# Patient Record
Sex: MALE | Race: WHITE | NOT HISPANIC OR LATINO | Employment: OTHER | URBAN - NONMETROPOLITAN AREA
[De-identification: names, ages, dates, MRNs, and addresses within clinical notes are randomized per-mention and may not be internally consistent; named-entity substitution may affect disease eponyms.]

---

## 2017-09-25 ENCOUNTER — TELEPHONE (OUTPATIENT)
Dept: FAMILY MEDICINE | Facility: OTHER | Age: 67
End: 2017-09-25

## 2017-09-25 NOTE — TELEPHONE ENCOUNTER
Per Dr. Espinoza, patient may be seen 9/27/17 to register at 8:15. Writer is unable to reach patient at number provided after 3 attempts.

## 2017-09-25 NOTE — TELEPHONE ENCOUNTER
8:17 AM    Reason for Call: OVERBOOK    Patient is having the following symptoms: back pain for two weeks    The patient is requesting an appointment for today with PCP Dr. Carmleina Espinoza.    Was an appointment offered for this call? No  If yes : Appointment type Short              Date None Available today    Preferred method for responding to this message: Telephone Call  What is your phone number ?[phone]    If we cannot reach you directly, may we leave a detailed response at the number you provided? No    Can this message wait until your PCP/provider returns, if unavailable today? PCP is in    [place]

## 2017-09-25 NOTE — TELEPHONE ENCOUNTER
9:38 AM    Reason for Call: OVERBOOK    Patient is having the following symptoms: hurt mid back lifting heavy object for 2 weeks.    The patient is requesting an appointment for 09/25/2017 with Dr Espinoza.    Was an appointment offered for this call? Yes  If yes : Appointment type              Date    Preferred method for responding to this message: Telephone Call/[phone] Wife Sendy's cell  What is your phone number ?    If we cannot reach you directly, may we leave a detailed response at the number you provided? yes    Can this message wait until your PCP/provider returns, if unavailable today? No,     Milagro Doshi

## 2017-09-27 ENCOUNTER — OFFICE VISIT (OUTPATIENT)
Dept: FAMILY MEDICINE | Facility: OTHER | Age: 67
End: 2017-09-27
Attending: FAMILY MEDICINE
Payer: COMMERCIAL

## 2017-09-27 VITALS
TEMPERATURE: 96.3 F | SYSTOLIC BLOOD PRESSURE: 132 MMHG | HEART RATE: 91 BPM | DIASTOLIC BLOOD PRESSURE: 88 MMHG | WEIGHT: 231 LBS | BODY MASS INDEX: 34.21 KG/M2 | HEIGHT: 69 IN | OXYGEN SATURATION: 96 %

## 2017-09-27 DIAGNOSIS — Z23 NEED FOR PROPHYLACTIC VACCINATION AND INOCULATION AGAINST INFLUENZA: ICD-10-CM

## 2017-09-27 DIAGNOSIS — M62.830 SPASM OF THORACIC BACK MUSCLE: Primary | ICD-10-CM

## 2017-09-27 PROCEDURE — 99213 OFFICE O/P EST LOW 20 MIN: CPT | Mod: 25 | Performed by: FAMILY MEDICINE

## 2017-09-27 PROCEDURE — 90471 IMMUNIZATION ADMIN: CPT | Performed by: FAMILY MEDICINE

## 2017-09-27 PROCEDURE — 90686 IIV4 VACC NO PRSV 0.5 ML IM: CPT | Performed by: FAMILY MEDICINE

## 2017-09-27 RX ORDER — CYCLOBENZAPRINE HCL 10 MG
10 TABLET ORAL 3 TIMES DAILY PRN
Qty: 30 TABLET | Refills: 1 | Status: SHIPPED | OUTPATIENT
Start: 2017-09-27 | End: 2018-05-02

## 2017-09-27 ASSESSMENT — PATIENT HEALTH QUESTIONNAIRE - PHQ9: SUM OF ALL RESPONSES TO PHQ QUESTIONS 1-9: 1

## 2017-09-27 ASSESSMENT — ANXIETY QUESTIONNAIRES
IF YOU CHECKED OFF ANY PROBLEMS ON THIS QUESTIONNAIRE, HOW DIFFICULT HAVE THESE PROBLEMS MADE IT FOR YOU TO DO YOUR WORK, TAKE CARE OF THINGS AT HOME, OR GET ALONG WITH OTHER PEOPLE: NOT DIFFICULT AT ALL
5. BEING SO RESTLESS THAT IT IS HARD TO SIT STILL: NOT AT ALL
3. WORRYING TOO MUCH ABOUT DIFFERENT THINGS: SEVERAL DAYS
4. TROUBLE RELAXING: NOT AT ALL
1. FEELING NERVOUS, ANXIOUS, OR ON EDGE: NOT AT ALL
2. NOT BEING ABLE TO STOP OR CONTROL WORRYING: NOT AT ALL
6. BECOMING EASILY ANNOYED OR IRRITABLE: NOT AT ALL
GAD7 TOTAL SCORE: 1
7. FEELING AFRAID AS IF SOMETHING AWFUL MIGHT HAPPEN: NOT AT ALL

## 2017-09-27 ASSESSMENT — PAIN SCALES - GENERAL: PAINLEVEL: MILD PAIN (2)

## 2017-09-27 NOTE — NURSING NOTE
"Chief Complaint   Patient presents with     Musculoskeletal Problem     mid back spasms, Duration- 3 weeks. specific cause- over work. pain therapies tried- heating pad, massage, pain pills( flexeril 10 mg)        Initial /88 (BP Location: Left arm, Patient Position: Chair, Cuff Size: Adult Large)  Pulse 91  Temp 96.3  F (35.7  C) (Tympanic)  Ht 5' 9\" (1.753 m)  Wt 231 lb (104.8 kg)  SpO2 96%  BMI 34.11 kg/m2 Estimated body mass index is 34.11 kg/(m^2) as calculated from the following:    Height as of this encounter: 5' 9\" (1.753 m).    Weight as of this encounter: 231 lb (104.8 kg).  Medication Reconciliation: complete     TYRELL IVERSON      "

## 2017-09-27 NOTE — PATIENT INSTRUCTIONS
Continue Tylenol since NSAIDs not tolerated.  Flexeril for muscle spasm as needed, limiting use - caution, may cause sedation.  Call if wanting referral to physical therapy.  Start home exercises as discussed.  Try tennis balls in sock as well.  Back Spasm (No Trauma)    Spasm of the back muscles can occur after a sudden forceful twisting or bending force (such as in a car accident), after a simple awkward movement, or after lifting something heavy with poor body positioning. In any case, muscle spasm adds to the pain. Sleeping in an awkward position or on a poor quality mattress can also cause this. Some people respond to emotional stress by tensing the muscles of their back.  Pain that continues may need further evaluation or other types of treatment such as physical therapy.  You don't always need X-rays for the initial evaluation of back pain, unless you had a physical injury such as from a car accident or fall. If your pain continues and doesn't respond to medical treatment, X-rays and other tests may then be done.   Home care    As soon as possible, start sitting or walking again to avoid problems from prolonged bed rest (muscle weakness, worsening back stiffness and pain, blood clots in the legs).    When in bed, try to find a position of comfort. A firm mattress is best. Try lying flat on your back with pillows under your knees. You can also try lying on your side with your knees bent up toward your chest and a pillow between your knees.    Avoid prolonged sitting, long car rides, or travel. This puts more stress on the lower back than standing or walking.     During the first 24 to 72 hours after an injury or flare-up, apply an ice pack to the painful area for 20 minutes, then remove it for 20 minutes. Do this over a period of 60 to 90 minutes or several times a day. This will reduce swelling and pain. Always wrap ice packs in a thin towel.    You can start with ice, then switch to heat. Heat (hot shower,  hot bath, or heating pad) reduces pain, and works well for muscle spasms. Apply heat to the painful area for 20 minutes, then remove it for 20 minutes. Do this over a period of 60 to 90 minutes or several times a day. Do not sleep on a heating pad as it can burn or damage skin.    Alternate ice and heat therapies.    Be aware of safe lifting methods and do not lift anything over 15 pounds until all the pain is gone.  Gentle stretching will help your back heal faster. Do this simple routine 2 to 3 times a day until your back is feeling better.    Lie on your back with your knees bent and both feet on the ground    Slowly raise your left knee to your chest as you flatten your lower back against the floor. Hold for 20 to 30 seconds.    Relax and repeat the exercise with your right knee.    Do 2 to 3 of these exercises for each leg.    Repeat, hugging both knees to your chest at the same time.    Do not bounce, but use a gentle pull.  Medicines  Talk to your doctor before using medicine, especially if you have other medical problems or are taking other medicines.  You may use acetaminophen or ibuprofen to control pain, unless your healthcare provider prescribed another pain medicine. If you have a chronic condition such as diabetes, liver or kidney disease, stomach ulcer, or gastrointestinal bleeding, or are taking blood thinners, talk with your healthcare provider before taking any medicines.  Be careful if you are given prescription pain medicine, narcotics, or medicine for muscle spasm. They can cause drowsiness, affect your coordination, reflexes, or judgment. Do not drive or operate heavy machinery when taking these medicines. Take pain medicine only as prescribed by your healthcare provider.  Follow-up care  Follow up with your doctor, or as advised. Physical therapy or further tests may be needed.  If X-rays were taken, they may be reviewed by a radiologist. You will be notified of any new findings that may  affect your care.  Call 911  Seek emergency medical care if any of these occur:    Trouble breathing    Confusion    Drowsiness or trouble awakening    Fainting or loss of consciousness    Rapid or very slow heart rate    Loss of bowel or bladder control  When to seek medical advice  Call your healthcare provider right away if any of these occur:    Pain becomes worse or spreads to your legs    Weakness or numbness in one or both legs    Numbness in the groin or genital area    Unexplained fever over 100.4 F (38.0 C)    Burning or pain when passing urine  Date Last Reviewed: 6/1/2016 2000-2017 [employer]. [address] [place] [address]. All rights reserved. This information is not intended as a substitute for professional medical care. Always follow your healthcare professional's instructions.

## 2017-09-27 NOTE — MR AVS SNAPSHOT
After Visit Summary   9/27/2017    Kevyn Henriquez    MRN: [id number]           Patient Information     Date Of Birth          1950        Visit Information        Provider Department      9/27/2017 8:30 AM Carmelina Espinoza MD [facility]        Today's Diagnoses     Spasm of thoracic back muscle    -  1      Care Instructions      Continue Tylenol since NSAIDs not tolerated.  Flexeril for muscle spasm as needed, limiting use - caution, may cause sedation.  Call if wanting referral to physical therapy.  Start home exercises as discussed.  Try tennis balls in sock as well.  Back Spasm (No Trauma)    Spasm of the back muscles can occur after a sudden forceful twisting or bending force (such as in a car accident), after a simple awkward movement, or after lifting something heavy with poor body positioning. In any case, muscle spasm adds to the pain. Sleeping in an awkward position or on a poor quality mattress can also cause this. Some people respond to emotional stress by tensing the muscles of their back.  Pain that continues may need further evaluation or other types of treatment such as physical therapy.  You don't always need X-rays for the initial evaluation of back pain, unless you had a physical injury such as from a car accident or fall. If your pain continues and doesn't respond to medical treatment, X-rays and other tests may then be done.   Home care    As soon as possible, start sitting or walking again to avoid problems from prolonged bed rest (muscle weakness, worsening back stiffness and pain, blood clots in the legs).    When in bed, try to find a position of comfort. A firm mattress is best. Try lying flat on your back with pillows under your knees. You can also try lying on your side with your knees bent up toward your chest and a pillow between your knees.    Avoid prolonged sitting, long car rides, or travel. This puts more stress on the lower back than standing  or walking.     During the first 24 to 72 hours after an injury or flare-up, apply an ice pack to the painful area for 20 minutes, then remove it for 20 minutes. Do this over a period of 60 to 90 minutes or several times a day. This will reduce swelling and pain. Always wrap ice packs in a thin towel.    You can start with ice, then switch to heat. Heat (hot shower, hot bath, or heating pad) reduces pain, and works well for muscle spasms. Apply heat to the painful area for 20 minutes, then remove it for 20 minutes. Do this over a period of 60 to 90 minutes or several times a day. Do not sleep on a heating pad as it can burn or damage skin.    Alternate ice and heat therapies.    Be aware of safe lifting methods and do not lift anything over 15 pounds until all the pain is gone.  Gentle stretching will help your back heal faster. Do this simple routine 2 to 3 times a day until your back is feeling better.    Lie on your back with your knees bent and both feet on the ground    Slowly raise your left knee to your chest as you flatten your lower back against the floor. Hold for 20 to 30 seconds.    Relax and repeat the exercise with your right knee.    Do 2 to 3 of these exercises for each leg.    Repeat, hugging both knees to your chest at the same time.    Do not bounce, but use a gentle pull.  Medicines  Talk to your doctor before using medicine, especially if you have other medical problems or are taking other medicines.  You may use acetaminophen or ibuprofen to control pain, unless your healthcare provider prescribed another pain medicine. If you have a chronic condition such as diabetes, liver or kidney disease, stomach ulcer, or gastrointestinal bleeding, or are taking blood thinners, talk with your healthcare provider before taking any medicines.  Be careful if you are given prescription pain medicine, narcotics, or medicine for muscle spasm. They can cause drowsiness, affect your coordination, reflexes, or  judgment. Do not drive or operate heavy machinery when taking these medicines. Take pain medicine only as prescribed by your healthcare provider.  Follow-up care  Follow up with your doctor, or as advised. Physical therapy or further tests may be needed.  If X-rays were taken, they may be reviewed by a radiologist. You will be notified of any new findings that may affect your care.  Call 911  Seek emergency medical care if any of these occur:    Trouble breathing    Confusion    Drowsiness or trouble awakening    Fainting or loss of consciousness    Rapid or very slow heart rate    Loss of bowel or bladder control  When to seek medical advice  Call your healthcare provider right away if any of these occur:    Pain becomes worse or spreads to your legs    Weakness or numbness in one or both legs    Numbness in the groin or genital area    Unexplained fever over 100.4 F (38.0 C)    Burning or pain when passing urine  Date Last Reviewed: 6/1/2016 2000-2017 [employer]. [address] [place] [address]. All rights reserved. This information is not intended as a substitute for professional medical care. Always follow your healthcare professional's instructions.                Follow-ups after your visit        Who to contact     If you have questions or need follow up information about today's clinic visit or your schedule please contact [facility] directly at [phone].  Normal or non-critical lab and imaging results will be communicated to you by MyChart, letter or phone within 4 business days after the clinic has received the results. If you do not hear from us within 7 days, please contact the clinic through MyChart or phone. If you have a critical or abnormal lab result, we will notify you by phone as soon as possible.  Submit refill requests through [employer] or call your pharmacy and they will forward the refill request to us. Please allow 3 business days for your refill to  "be completed.          Additional Information About Your Visit        [employer] Information     [employer] gives you secure access to your electronic health record. If you see a primary care provider, you can also send messages to your care team and make appointments. If you have questions, please call your primary care clinic.  If you do not have a primary care provider, please call [phone] and they will assist you.        Care EveryWhere ID     This is your Care EveryWhere ID. This could be used by other organizations to access your [facility] medical records  [id number]        Your Vitals Were     Pulse Temperature Height Pulse Oximetry BMI (Body Mass Index)       91 96.3  F (35.7  C) (Tympanic) 5' 9\" (1.753 m) 96% 34.11 kg/m2        Blood Pressure from Last 3 Encounters:   09/27/17 132/88   08/14/15 134/80   07/22/15 130/70    Weight from Last 3 Encounters:   09/27/17 231 lb (104.8 kg)   08/14/15 195 lb (88.5 kg)   07/22/15 190 lb (86.2 kg)              Today, you had the following     No orders found for display         Today's Medication Changes          These changes are accurate as of: 9/27/17  9:01 AM.  If you have any questions, ask your nurse or doctor.               Start taking these medicines.        Dose/Directions    cyclobenzaprine 10 MG tablet   Commonly known as:  FLEXERIL   Used for:  Spasm of thoracic back muscle   Started by:  Carmelina Espinoza MD        Dose:  10 mg   Take 1 tablet (10 mg) by mouth 3 times daily as needed for muscle spasms   Quantity:  30 tablet   Refills:  1         Stop taking these medicines if you haven't already. Please contact your care team if you have questions.     ALEVE PO   Stopped by:  Carmelina Espinoza MD                Where to get your medicines      These medications were sent to [id number] - [place] - [address]  [address] [address]     Phone:  [phone]     cyclobenzaprine 10 MG tablet                Primary Care " Provider Office Phone # Fax #    Carmelina Espinoza [phone]       [facility] [address]  [place] [address]        Equal Access to Services     MARGARETKASSY DK : Hadii aad ku haddeshawno Soomaali, waaxda luqadaha, qaybta kaalmada adeegyada, waxnichole perezn aixa cartwright larenkody lety. So [facility] [phone].    ATENCIÓN: Si habla español, tiene a livingston disposición servicios gratuitos de asistencia lingüística. Llame al [phone].    We comply with applicable federal civil rights laws and [place] laws. We do not discriminate on the basis of race, color, national origin, age, disability sex, sexual orientation or gender identity.            Thank you!     Thank you for choosing [facility]  for your care. Our goal is always to provide you with excellent care. Hearing back from our patients is one way we can continue to improve our services. Please take a few minutes to complete the written survey that you may receive in the mail after your visit with us. Thank you!             Your Updated Medication List - Protect others around you: Learn how to safely use, store and throw away your medicines at www.disposemymeds.org.          This list is accurate as of: 9/27/17  9:01 AM.  Always use your most recent med list.                   Brand Name Dispense Instructions for use Diagnosis    aspirin 81 MG tablet      Take 81 mg by mouth daily        cyclobenzaprine 10 MG tablet    FLEXERIL    30 tablet    Take 1 tablet (10 mg) by mouth 3 times daily as needed for muscle spasms    Spasm of thoracic back muscle

## 2017-09-27 NOTE — PROGRESS NOTES
SUBJECTIVE:  Kevyn is a 66 year old male who comes in today for back spasms, onset 3 weeks ago, gradual, progressive.  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  Pain is in mid back, right side.  No extremity numbness, weakness, or tingling.  No change in bowel/bladder.  No fever.  Tylenol takes edge off.  Can't tolerate NSAIDS - cause swelling in hands.  Tried a couple of friend's Flexeril and had some relief.  Work project will last 4-6 weeks.  Massage helps some as well.      Would like flu shot.    Current Outpatient Prescriptions   Medication     aspirin 81 MG tablet     cyclobenzaprine (FLEXERIL) 10 MG tablet     No current facility-administered medications for this visit.       No Known Allergies    Past Medical History:   Diagnosis Date     Tobacco abuse      Past Surgical History:   Procedure Laterality Date     EXCISE GANGLION WRIST Left 4/30/2015    Procedure: EXCISE GANGLION WRIST;  Surgeon: Phyllis Tuttle MD;  Location: [facility]     EXCISE MASS NECK N/A 4/30/2015    Procedure: EXCISE MASS NECK;  Surgeon: Phyllis Tuttle MD;  Location: [facility] OR     HC EXCIS PRIMARY GANGLION WRIST      left wrist     Social History     Social History     Marital status:      Spouse name: N/A     Number of children: N/A     Years of education: N/A     Occupational History     Not on file.     Social History Main Topics     Smoking status: Current Every Day Smoker     Packs/day: 0.50     Types: Cigarettes     Smokeless tobacco: Never Used     Alcohol use No     Drug use: No     Sexual activity: Yes     Partners: Female     Other Topics Concern     Parent/Sibling W/ Cabg, Mi Or Angioplasty Before 65f 55m? Yes      Service No     Blood Transfusions Yes     Permits if needed     Caffeine Concern No     Occupational Exposure No     Hobby Hazards No     Sleep Concern No     Stress Concern No     Weight Concern No     Back Care No     Exercise  "Yes     daily     Seat Belt Yes     Social History Narrative       ROS:  General: negative for, fever  Skin: negative for, rash, bruising  GI: negative  : negative  Musculoskeletal: positive for as above and back pain, negative for and muscular weakness  Neurologic: negative for, local weakness, numbness or tingling of hands and numbness or tingling of feet      OBJECTIVE:  Vitals:    09/27/17 0811   BP: 132/88   BP Location: Left arm   Patient Position: Chair   Cuff Size: Adult Large   Pulse: 91   Temp: 96.3  F (35.7  C)   TempSrc: Tympanic   SpO2: 96%   Weight: 231 lb (104.8 kg)   Height: 5' 9\" (1.753 m)     GENERAL APPEARANCE: healthy, alert and no distress  MS: extremities normal- no gross deformities noted, gait normal, normal muscle tone, peripheral pulses normal, normal range of motion all extremities and normal forward flexion and tender to palpation right thoracic paraspinal; negative SLR; able to toe/heel raise; symmetric reflexes  SKIN: no suspicious lesions or rashes  NEURO: Normal strength and tone, sensory exam grossly normal, mentation intact and speech normal  PSYCH: mentation appears normal. and affect normal/bright    ASSESSMENT/ORDERS:    ICD-10-CM    1. Spasm of thoracic back muscle M62.830 cyclobenzaprine (FLEXERIL) 10 MG tablet     PLAN:  Continue Tylenol since NSAIDs not tolerated.  Flexeril for muscle spasm as needed, limiting use - caution, may cause sedation.  Call if wanting referral to physical therapy.  Start home exercises as discussed.  Try tennis balls in sock as well.    Carmelina Pappas    "

## 2017-09-28 ASSESSMENT — ANXIETY QUESTIONNAIRES: GAD7 TOTAL SCORE: 1

## 2018-01-08 ENCOUNTER — RADIANT APPOINTMENT (OUTPATIENT)
Dept: GENERAL RADIOLOGY | Facility: OTHER | Age: 68
End: 2018-01-08
Attending: FAMILY MEDICINE
Payer: MEDICARE

## 2018-01-08 ENCOUNTER — OFFICE VISIT (OUTPATIENT)
Dept: FAMILY MEDICINE | Facility: OTHER | Age: 68
End: 2018-01-08
Attending: FAMILY MEDICINE
Payer: MEDICARE

## 2018-01-08 VITALS
OXYGEN SATURATION: 94 % | TEMPERATURE: 97.4 F | DIASTOLIC BLOOD PRESSURE: 72 MMHG | HEART RATE: 101 BPM | HEIGHT: 69 IN | SYSTOLIC BLOOD PRESSURE: 138 MMHG | WEIGHT: 231 LBS | RESPIRATION RATE: 16 BRPM | BODY MASS INDEX: 34.21 KG/M2

## 2018-01-08 DIAGNOSIS — R07.81 RIB PAIN ON RIGHT SIDE: ICD-10-CM

## 2018-01-08 DIAGNOSIS — F17.200 TOBACCO USE DISORDER: Primary | ICD-10-CM
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
## 2018-01-08 PROCEDURE — G0463 HOSPITAL OUTPT CLINIC VISIT: HCPCS

## 2018-01-08 PROCEDURE — 99213 OFFICE O/P EST LOW 20 MIN: CPT | Performed by: FAMILY MEDICINE

## 2018-01-08 PROCEDURE — 71101 X-RAY EXAM UNILAT RIBS/CHEST: CPT | Mod: TC,RT

## 2018-01-08 ASSESSMENT — PAIN SCALES - GENERAL: PAINLEVEL: MILD PAIN (3)

## 2018-01-08 ASSESSMENT — ANXIETY QUESTIONNAIRES
7. FEELING AFRAID AS IF SOMETHING AWFUL MIGHT HAPPEN: NOT AT ALL
5. BEING SO RESTLESS THAT IT IS HARD TO SIT STILL: NOT AT ALL
1. FEELING NERVOUS, ANXIOUS, OR ON EDGE: NOT AT ALL
6. BECOMING EASILY ANNOYED OR IRRITABLE: NOT AT ALL
3. WORRYING TOO MUCH ABOUT DIFFERENT THINGS: NOT AT ALL
4. TROUBLE RELAXING: NOT AT ALL
2. NOT BEING ABLE TO STOP OR CONTROL WORRYING: SEVERAL DAYS
IF YOU CHECKED OFF ANY PROBLEMS ON THIS QUESTIONNAIRE, HOW DIFFICULT HAVE THESE PROBLEMS MADE IT FOR YOU TO DO YOUR WORK, TAKE CARE OF THINGS AT HOME, OR GET ALONG WITH OTHER PEOPLE: NOT DIFFICULT AT ALL
GAD7 TOTAL SCORE: 1

## 2018-01-08 ASSESSMENT — PATIENT HEALTH QUESTIONNAIRE - PHQ9: SUM OF ALL RESPONSES TO PHQ QUESTIONS 1-9: 2

## 2018-01-08 NOTE — MR AVS SNAPSHOT
After Visit Summary   1/8/2018    Kevyn Henriquez    MRN: [id number]           Patient Information     Date Of Birth          1950        Visit Information        Provider Department      1/8/2018 10:00 AM Carmelina Espinoza MD [facility]        Today's Diagnoses     Tobacco use disorder    -  1    Rib pain on right side        Fall, initial encounter          Care Instructions    Will call with xray results.  Consider physical therapy vs chiropractic.  Call for referral if needed.  Smoking cessation advised.            Follow-ups after your visit        Who to contact     If you have questions or need follow up information about today's clinic visit or your schedule please contact [facility] directly at [phone].  Normal or non-critical lab and imaging results will be communicated to you by [employer], letter or phone within 4 business days after the clinic has received the results. If you do not hear from us within 7 days, please contact the clinic through [employer] or phone. If you have a critical or abnormal lab result, we will notify you by phone as soon as possible.  Submit refill requests through [employer] or call your pharmacy and they will forward the refill request to us. Please allow 3 business days for your refill to be completed.          Additional Information About Your Visit        MyChart Information     [employer] gives you secure access to your electronic health record. If you see a primary care provider, you can also send messages to your care team and make appointments. If you have questions, please call your primary care clinic.  If you do not have a primary care provider, please call [phone] and they will assist you.        Care EveryWhere ID     This is your Care EveryWhere ID. This could be used by other organizations to access your [facility] medical records  [id number]        Your Vitals Were     Pulse Temperature Respirations Height Pulse  "Oximetry BMI (Body Mass Index)    101 97.4  F (36.3  C) (Tympanic) 16 5' 9\" (1.753 m) 94% 34.11 kg/m2       Blood Pressure from Last 3 Encounters:   01/08/18 138/72   09/27/17 132/88   08/14/15 134/80    Weight from Last 3 Encounters:   01/08/18 231 lb (104.8 kg)   09/27/17 231 lb (104.8 kg)   08/14/15 195 lb (88.5 kg)              We Performed the Following     XR Ribs & Chest Rt 3vw        Primary Care Provider Office Phone # Fax #    Carmelina Espinoza [phone]       [facility] [address] [facility] [address]        Equal Access to Services     SVEN ROOT : Hadii ethel shaver hadasho Soomaali, waaxda luqadaha, qaybta kaalmada adeegyada, waxnichole ma haytrever tapia . So [facility] [phone].    ATENCIÓN: Si habla español, tiene a livingston disposición servicios gratuitos de asistencia lingüística. Llame al [phone].    We comply with applicable federal civil rights laws and [place] laws. We do not discriminate on the basis of race, color, national origin, age, disability, sex, sexual orientation, or gender identity.            Thank you!     Thank you for choosing [facility]  for your care. Our goal is always to provide you with excellent care. Hearing back from our patients is one way we can continue to improve our services. Please take a few minutes to complete the written survey that you may receive in the mail after your visit with us. Thank you!             Your Updated Medication List - Protect others around you: Learn how to safely use, store and throw away your medicines at www.disposemymeds.org.          This list is accurate as of: 1/8/18 12:51 PM.  Always use your most recent med list.                   Brand Name Dispense Instructions for use Diagnosis    aspirin 81 MG tablet      Take 81 mg by mouth daily        cyclobenzaprine 10 MG tablet    FLEXERIL    30 tablet    Take 1 tablet (10 mg) by mouth 3 times daily as needed for muscle spasms    Spasm " of thoracic back muscle

## 2018-01-08 NOTE — PATIENT INSTRUCTIONS
Will call with xray results.  Consider physical therapy vs chiropractic.  Call for referral if needed.  Smoking cessation advised.

## 2018-01-08 NOTE — NURSING NOTE
"Chief Complaint   Patient presents with     Fall     Last week fell.  Right side rib pain, back of head, and Left side of neck     Back Pain     Stated he saw you for it before, uses muscle relaxers that help a lot.  Gets a \"stitch\"   or a side ache type pain.  States it's below the ribs.  Only on the right side.       Initial /72 (BP Location: Left arm, Patient Position: Sitting, Cuff Size: Adult Regular)  Pulse 101  Temp 97.4  F (36.3  C) (Tympanic)  Resp 16  Ht 5' 9\" (1.753 m)  Wt 231 lb (104.8 kg)  SpO2 94%  BMI 34.11 kg/m2 Estimated body mass index is 34.11 kg/(m^2) as calculated from the following:    Height as of this encounter: 5' 9\" (1.753 m).    Weight as of this encounter: 231 lb (104.8 kg).  Medication Reconciliation: complete     Dian Turner      "

## 2018-01-08 NOTE — PROGRESS NOTES
SUBJECTIVE:  Kevyn is a 67 year old male who comes in today for evaluation after a fall last week, slipping on ice, landing on side and hitting head.  Reports right sided rib pain, posterior head pain and left neck.  History of back spasm, prescribed Flexeril in 9/2017.  Used some Flexeril and that helped.  Also, used wife's Hydrocodone, even though he acknowledges that he should not do this.  Was having trouble sleeping due to rib pain.  Mild headache since fall.  No confusion, vision changes, nausea, vomiting, numbness, tingling or weakness, or change in bowel/bladder.    Continues to smoke.    Current Outpatient Prescriptions   Medication     aspirin 81 MG tablet     cyclobenzaprine (FLEXERIL) 10 MG tablet     No current facility-administered medications for this visit.       No Known Allergies    Past Medical History:   Diagnosis Date     Tobacco abuse      Past Surgical History:   Procedure Laterality Date     EXCISE GANGLION WRIST Left 4/30/2015    Procedure: EXCISE GANGLION WRIST;  Surgeon: Phyllis Tuttle MD;  [facility]     EXCISE MASS NECK N/A 4/30/2015    Procedure: EXCISE MASS NECK;  Surgeon: Phyllis Tuttle MD;  Location: [facility]     HC EXCIS PRIMARY GANGLION WRIST      left wrist     Social History     Social History     Marital status:      Spouse name: N/A     Number of children: N/A     Years of education: N/A     Occupational History     Not on file.     Social History Main Topics     Smoking status: Current Every Day Smoker     Packs/day: 0.50     Types: Cigarettes     Smokeless tobacco: Never Used     Alcohol use No     Drug use: No     Sexual activity: Yes     Partners: Female     Other Topics Concern     Parent/Sibling W/ Cabg, Mi Or Angioplasty Before 65f 55m? Yes      Service No     Blood Transfusions Yes     Permits if needed     Caffeine Concern No     Occupational Exposure No     Hobby Hazards No     Sleep Concern No     Stress Concern No     Weight Concern No      "Back Care No     Exercise Yes     daily     Seat Belt Yes     Social History Narrative       ROS:  General: positive for as above, negative for, fever, chills, dizziness, fatigue  Skin: negative for, bruising  Eyes: negative for, visual blurring, double vision  ENT: negative  Resp: No shortness of breath and No cough  CV: negative for, palpitations, chest pain and exertional chest pain or pressure  GI: negative for, nausea and vomiting  : negative  Musculoskeletal: positive for as above, back pain and rib pain  Neurologic: positive for as above and headaches, negative for, syncope, local weakness, numbness or tingling of hands, numbness or tingling of feet and involuntary movements    OBJECTIVE:  Vitals:    01/08/18 1009   BP: 138/72   BP Location: Left arm   Patient Position: Sitting   Cuff Size: Adult Regular   Pulse: 101   Resp: 16   Temp: 97.4  F (36.3  C)   TempSrc: Tympanic   SpO2: 94%   Weight: 231 lb (104.8 kg)   Height: 5' 9\" (1.753 m)     GENERAL APPEARANCE: healthy, alert and no distress  EYES: EOMI, fundi benign- PERRL  NECK: no adenopathy, no asymmetry, masses, or scars and thyroid normal to palpation  RESP: lungs clear to auscultation - no rales, rhonchi or wheezes  CV: regular rates and rhythm, normal S1 S2, no S3 or S4 and no murmur, click or rub -  ABDOMEN:  soft, nontender, no HSM or masses and bowel sounds normal  MS: extremities normal- no gross deformities noted, gait normal, normal muscle tone and tender to palpation right anterior lower ribs; no step off palpated; also tender right paraspinal thoracic region; full AROM extremities and neck  SKIN: no suspicious lesions or rashes  NEURO: Normal strength and tone, sensory exam grossly normal, mentation intact and speech normal  PSYCH: mentation appears normal. and affect normal/bright    ASSESSMENT/ORDERS:    ICD-10-CM    1. Tobacco use disorder F17.200    2. Rib pain on right side R07.81 XR Ribs & Chest Rt 3vw     PLAN:  Xray reviewed - report " pending.  No red flags.    Discussed not taking other people's medications.    Discussed monitoring for worsening headache, confusion, vision change, etc, to suggest something such as subdural hematoma.  See below.    Patient Instructions   Will call with xray results.  Consider physical therapy vs chiropractic.  Call for referral if needed.  Smoking cessation advised.          Carmelina Pappas

## 2018-01-09 ASSESSMENT — ANXIETY QUESTIONNAIRES: GAD7 TOTAL SCORE: 1

## 2018-01-11 ENCOUNTER — TELEPHONE (OUTPATIENT)
Dept: FAMILY MEDICINE | Facility: OTHER | Age: 68
End: 2018-01-11

## 2018-01-11 NOTE — TELEPHONE ENCOUNTER
Reason for call:  RESULTS    1. What is the test that was ordered? Xray ribs and chest  2. Who ordered your test? Dr Espinoza  3. When was the test performed?  1-8-18  Description: Patient is requesting the results of the xray performed  Was an appointment offered for this a call? No  If Yes :  Appointment type                 Date    Preferred method for responding to this message: Telephone Call  What is your phone number ? [phone]    If we cannot reach you directly, may we leave a detailed response at the number you provided? Yes  Can this message wait until your PCP/Provider returns if not available today? YES

## 2018-01-11 NOTE — TELEPHONE ENCOUNTER
Notified patient via phone of xray results:  Old vs new right 7th rib fracture.  It is in area of tenderness from fall, so likely is new.  Rest, pain control, as discussed.  No further questions at this time.

## 2018-02-14 ENCOUNTER — DOCUMENTATION ONLY (OUTPATIENT)
Dept: VASCULAR SURGERY | Facility: CLINIC | Age: 68
End: 2018-02-14

## 2018-02-14 DIAGNOSIS — Z72.0 CURRENT TOBACCO USE: Primary | ICD-10-CM

## 2018-02-14 DIAGNOSIS — Z13.6 ENCOUNTER FOR ABDOMINAL AORTIC ANEURYSM (AAA) SCREENING: Primary | ICD-10-CM

## 2018-05-02 ENCOUNTER — OFFICE VISIT (OUTPATIENT)
Dept: FAMILY MEDICINE | Facility: OTHER | Age: 68
End: 2018-05-02
Attending: FAMILY MEDICINE
Payer: MEDICARE

## 2018-05-02 VITALS
DIASTOLIC BLOOD PRESSURE: 88 MMHG | WEIGHT: 256 LBS | HEART RATE: 89 BPM | TEMPERATURE: 97 F | HEIGHT: 69 IN | OXYGEN SATURATION: 95 % | SYSTOLIC BLOOD PRESSURE: 132 MMHG | BODY MASS INDEX: 37.92 KG/M2

## 2018-05-02 DIAGNOSIS — F17.200 TOBACCO USE DISORDER: ICD-10-CM

## 2018-05-02 DIAGNOSIS — Z71.6 ENCOUNTER FOR TOBACCO USE CESSATION COUNSELING: ICD-10-CM

## 2018-05-02 DIAGNOSIS — Z12.11 SPECIAL SCREENING FOR MALIGNANT NEOPLASMS, COLON: Primary | ICD-10-CM

## 2018-05-02 PROCEDURE — G0463 HOSPITAL OUTPT CLINIC VISIT: HCPCS

## 2018-05-02 PROCEDURE — 99213 OFFICE O/P EST LOW 20 MIN: CPT | Performed by: FAMILY MEDICINE

## 2018-05-02 ASSESSMENT — PAIN SCALES - GENERAL: PAINLEVEL: NO PAIN (0)

## 2018-05-02 NOTE — PROGRESS NOTES
SUBJECTIVE:   Kevyn Henriquez is a 67 year old male who presents to clinic today for the following health issues:      Loose Stools      Duration: 3 weeks    Description (location/character/radiation): Bowels    Intensity:  moderate    Accompanying signs and symptoms: Was having loose stools,cramping,bloated    Straining and now hemorrhoid issues    History (similar episodes/previous evaluation): None    Precipitating or alleviating factors: None    Therapies tried and outcome: Took imodium and was not working and now having normal    stools     Was having lower cramping, bloating, loose stool 2-3 times per day.  Flared hemorrhoids with blood with wiping.  Felt some urgency, then straining.  No tar/black stools.  No fever.  Eating well.  No nausea, vomiting.  No recent travel, antibiotics, or sick contacts other than his dogs.      All symptoms resolved 3 days ago.    Has never had colonoscopy.  Is ready to do so now.    Is smoking and considering quit effort again.    Problem list and histories reviewed & adjusted, as indicated.  Additional history: as documented    Current Outpatient Prescriptions   Medication     aspirin 81 MG tablet     Cholecalciferol (VITAMIN D-3 PO)     No current facility-administered medications for this visit.        Patient Active Problem List   Diagnosis     Tobacco abuse     Past Surgical History:   Procedure Laterality Date     EXCISE GANGLION WRIST Left 4/30/2015    Procedure: EXCISE GANGLION WRIST;  Surgeon: Phyllis Tuttle MD;  [facility]     EXCISE MASS NECK N/A 4/30/2015    Procedure: EXCISE MASS NECK;  Surgeon: Phyllis Tuttle MD;  [facility]: [facility]     HC EXCIS PRIMARY GANGLION WRIST      left wrist       Social History   Substance Use Topics     Smoking status: Current Every Day Smoker     Packs/day: 0.50     Types: Cigarettes     Smokeless tobacco: Never Used     Alcohol use No     Family History   Problem Relation Age of Onset     DIABETES Mother      borderline  "diabetes     HEART DISEASE Father      Hypertension Father      Hypertension Brother      Hypertension Brother            Reviewed and updated as needed this visit by clinical staff  Tobacco  Allergies  Meds  Med Hx  Surg Hx  Fam Hx  Soc Hx      Reviewed and updated as needed this visit by Provider         ROS:  Constitutional, HEENT, cardiovascular, pulmonary, gi and gu systems are negative, except as otherwise noted.    OBJECTIVE:     /88  Pulse 89  Temp 97  F (36.1  C)  Ht 5' 9\" (1.753 m)  Wt 256 lb (116.1 kg)  SpO2 95%  BMI 37.8 kg/m2  Body mass index is 37.8 kg/(m^2).  GENERAL: alert, no distress and over weight  RESP: lungs clear to auscultation - no rales, rhonchi or wheezes  CV: regular rate and rhythm, normal S1 S2, no S3 or S4, no murmur, click or rub, no peripheral edema and peripheral pulses strong  ABDOMEN: soft, nontender, no hepatosplenomegaly, no masses and bowel sounds normal  MS: no gross musculoskeletal defects noted, no edema  PSYCH: mentation appears normal, affect normal/bright      ASSESSMENT/PLAN:     (Z12.11) Special screening for malignant neoplasms, colon  (primary encounter diagnosis)  Plan: GENERAL SURG ADULT REFERRAL    (F17.200) Tobacco use disorder  (Z71.6) Encounter for tobacco use cessation counseling    Patient Instructions   Referral to general surgery for colonoscopy.  Smoking cessation advised.  Consider additional health care maintenance - labs, lung screen, etc.    Did discuss health care maintenance - labs, AAA u/s, CT lung, immunizations, dietician referral, smoking cessation.  Patient declined.      Carmelina Pappas MD  [facility] [place]  "

## 2018-05-02 NOTE — PATIENT INSTRUCTIONS
Referral to general surgery for colonoscopy.  Smoking cessation advised.  Consider additional health care maintenance - labs, lung screen, etc.

## 2018-05-02 NOTE — MR AVS SNAPSHOT
After Visit Summary   5/2/2018    Kevyn Henriquez    MRN: [id number]           Patient Information     Date Of Birth          1950        Visit Information        Provider Department      5/2/2018 8:45 AM Carmelina Espinoza MD [facility] [place]        Today's Diagnoses     Special screening for malignant neoplasms, colon    -  1      Care Instructions    Referral to general surgery for colonoscopy.  Smoking cessation advised.  Consider additional health care maintenance - labs, lung screen, etc.          Follow-ups after your visit        Additional Services     GENERAL SURG ADULT REFERRAL       Your provider has referred you to: [facility]: [facility] [place] [phone]   [email or web address]    Please be aware that coverage of these services is subject to the terms and limitations of your health insurance plan.  Call member services at your health plan with any benefit or coverage questions.      Please bring the following with you to your appointment:    (1) Any X-Rays, CTs or MRIs which have been performed.  Contact the facility where they were done to arrange for  prior to your scheduled appointment.   (2) List of current medications   (3) This referral request   (4) Any documents/labs given to you for this referral                  Who to contact     If you have questions or need follow up information about today's clinic visit or your schedule please contact [facility] directly at [phone].  Normal or non-critical lab and imaging results will be communicated to you by MyChart, letter or phone within 4 business days after the clinic has received the results. If you do not hear from us within 7 days, please contact the clinic through MyChart or phone. If you have a critical or abnormal lab result, we will notify you by phone as soon as possible.  Submit refill requests through [facility] [employer] or call your  "pharmacy and they will forward the refill request to us. Please allow 3 business days for your refill to be completed.          Additional Information About Your Visit        MyChart Information     [employer] gives you secure access to your electronic health record. If you see a primary care provider, you can also send messages to your care team and make appointments. If you have questions, please call your primary care clinic.  If you do not have a primary care provider, please call [phone] and they will assist you.        Care EveryWhere ID     This is your Care EveryWhere ID. This could be used by other organizations to access your [facility] medical records  [id number]        Your Vitals Were     Pulse Temperature Height Pulse Oximetry BMI (Body Mass Index)       89 97  F (36.1  C) 5' 9\" (1.753 m) 95% 37.8 kg/m2        Blood Pressure from Last 3 Encounters:   05/02/18 132/88   01/08/18 138/72   09/27/17 132/88    Weight from Last 3 Encounters:   05/02/18 256 lb (116.1 kg)   01/08/18 231 lb (104.8 kg)   09/27/17 231 lb (104.8 kg)              We Performed the Following     GENERAL SURG ADULT REFERRAL        Primary Care Provider Office Phone # Fax #    Carmelina Espinoza [phone] [address] [place] [address]        Equal Access to Services     [facility] : Hadii aad ku hadasho Soomaali, waaxda luqadaha, qaybta kaalmada adeegyada, es davidson. So [facility] [phone].    ATENCIÓN: Si habla español, tiene a livingston disposición servicios gratuitos de asistencia lingüística. Llalexsandra al [phone].    We comply with applicable federal civil rights laws and [place] laws. We do not discriminate on the basis of race, color, national origin, age, disability, sex, sexual orientation, or gender identity.            Thank you!     Thank you for choosing [facility]  for your care. Our goal is always to provide you with excellent care. Hearing back " from our patients is one way we can continue to improve our services. Please take a few minutes to complete the written survey that you may receive in the mail after your visit with us. Thank you!             Your Updated Medication List - Protect others around you: Learn how to safely use, store and throw away your medicines at www.disposemymeds.org.          This list is accurate as of 5/2/18  9:03 AM.  Always use your most recent med list.                   Brand Name Dispense Instructions for use Diagnosis    aspirin 81 MG tablet      Take 81 mg by mouth daily        VITAMIN D-3 PO

## 2018-05-02 NOTE — NURSING NOTE
"Chief Complaint   Patient presents with     Diarrhea       Initial /88  Pulse 89  Temp 97  F (36.1  C)  Ht 5' 9\" (1.753 m)  Wt 256 lb (116.1 kg)  SpO2 95%  BMI 37.8 kg/m2 Estimated body mass index is 37.8 kg/(m^2) as calculated from the following:    Height as of this encounter: 5' 9\" (1.753 m).    Weight as of this encounter: 256 lb (116.1 kg).  Medication Reconciliation: complete   [facility]   Medical Assistant      "

## 2018-05-17 ENCOUNTER — APPOINTMENT (OUTPATIENT)
Dept: GENERAL RADIOLOGY | Facility: HOSPITAL | Age: 68
End: 2018-05-17
Attending: PHYSICIAN ASSISTANT
Payer: MEDICARE

## 2018-05-17 ENCOUNTER — HOSPITAL ENCOUNTER (EMERGENCY)
Facility: HOSPITAL | Age: 68
Discharge: HOME | End: 2018-05-17
Attending: PHYSICIAN ASSISTANT | Admitting: PHYSICIAN ASSISTANT
Payer: MEDICARE

## 2018-05-17 VITALS
DIASTOLIC BLOOD PRESSURE: 111 MMHG | TEMPERATURE: 97 F | HEART RATE: 76 BPM | OXYGEN SATURATION: 95 % | SYSTOLIC BLOOD PRESSURE: 165 MMHG | RESPIRATION RATE: 18 BRPM

## 2018-05-17 DIAGNOSIS — S92.502A CLOSED FRACTURE OF FIFTH TOE OF LEFT FOOT, INITIAL ENCOUNTER: ICD-10-CM

## 2018-05-17 DIAGNOSIS — R03.0 ELEVATED BLOOD PRESSURE READING WITHOUT DIAGNOSIS OF HYPERTENSION: ICD-10-CM

## 2018-05-17 PROCEDURE — G0463 HOSPITAL OUTPT CLINIC VISIT: HCPCS

## 2018-05-17 PROCEDURE — 73660 X-RAY EXAM OF TOE(S): CPT | Mod: TC,LT

## 2018-05-17 PROCEDURE — 99213 OFFICE O/P EST LOW 20 MIN: CPT | Performed by: PHYSICIAN ASSISTANT

## 2018-05-17 ASSESSMENT — ENCOUNTER SYMPTOMS
JOINT SWELLING: 1
ARTHRALGIAS: 1
PSYCHIATRIC NEGATIVE: 1
CARDIOVASCULAR NEGATIVE: 1
CONSTITUTIONAL NEGATIVE: 1

## 2018-05-17 NOTE — PROGRESS NOTES
XR Toe Left G/E 2 Views   IMPRESSION: Fracture through the proximal phalanx of the small toe.    Fracture diagnosed at visit.

## 2018-05-17 NOTE — ED PROVIDER NOTES
"  History     Chief Complaint   Patient presents with     Toe Pain     \"stubbed my left little toe 30 minutes ago and now its crooked\"     The history is provided by the patient. No  was used.     Kevyn Henriquez is a 67 year old male who     Problem List:    Patient Active Problem List    Diagnosis Date Noted     Tobacco abuse      Priority: Medium        Past Medical History:    Past Medical History:   Diagnosis Date     Tobacco abuse        Past Surgical History:    Past Surgical History:   Procedure Laterality Date     EXCISE GANGLION WRIST Left 4/30/2015    Procedure: EXCISE GANGLION WRIST;  Surgeon: Phyllis Tuttle MD;  [facility]     EXCISE MASS NECK N/A 4/30/2015    Procedure: EXCISE MASS NECK;  Surgeon: Phyllis Tuttle MD;  Location: [facility]     HC EXCIS PRIMARY GANGLION WRIST      left wrist       Family History:    Family History   Problem Relation Age of Onset     DIABETES Mother      borderline diabetes     HEART DISEASE Father      Hypertension Father      Hypertension Brother      Hypertension Brother        Social History:  Marital Status:   [2]  Social History   Substance Use Topics     Smoking status: Current Every Day Smoker     Packs/day: 0.50     Types: Cigarettes     Smokeless tobacco: Never Used     Alcohol use No        Medications:      aspirin 81 MG tablet   Cholecalciferol (VITAMIN D-3 PO)         Review of Systems   Constitutional: Negative.    Cardiovascular: Negative.    Musculoskeletal: Positive for arthralgias, gait problem and joint swelling.   Psychiatric/Behavioral: Negative.        Physical Exam   BP: (!) 165/111  Pulse: 76  Temp: 97  F (36.1  C)  Resp: 18  SpO2: 95 %      Physical Exam   Constitutional: He is oriented to person, place, and time. He appears well-developed and well-nourished. No distress.   Cardiovascular: Normal rate.    Pulmonary/Chest: Effort normal.   Musculoskeletal:   Left 5th toe: decreased flexion due to pain. No e/e/e/e. " M/n/v intact. moderate TTP to PIP joint area, 3/5 strength due to pain.   Neurological: He is alert and oriented to person, place, and time.   Skin: He is not diaphoretic.   Psychiatric: He has a normal mood and affect.   Nursing note and vitals reviewed.      ED Course     ED Course     Procedures          Results for orders placed or performed during the hospital encounter of 05/17/18 (from the past 24 hour(s))   XR Toe Left G/E 2 Views    Narrative    PROCEDURE: XR TOE LT G/E 2 VW 5/17/2018 11:53 AM    HISTORY: pain, direct trauma to 5th toe;     COMPARISONS: None.    TECHNIQUE: 2 views.    FINDINGS: There is an obliquely oriented minimally angulated fracture  through the midportion of the proximal phalanx of the small toe.    No other fracture is seen. Mild degenerative changes seen in several  interphalangeal joints.         Impression    IMPRESSION: Fracture through the proximal phalanx of the small toe.    HARSH BARR MD     I used coban to tape the 4-5th toes together.  Post Op shoe applied. Pt feels much better      Assessments & Plan (with Medical Decision Making)     I have reviewed the nursing notes.    I have reviewed the findings, diagnosis, plan and need for follow up with the patient.    Final diagnoses:   Closed fracture of fifth toe of left foot, initial encounter   Elevated blood pressure reading without diagnosis of hypertension - 165/111         Wear shoe for 3 weeks. If you have any pain, wear for one more week and f/u with your PCP.  Patient verbally educated and given appropriate education sheets for the diagnoses and has no questions.  Follow up with your Primary Care provider if symptoms increase or if not resolving in next 3 weeks.  if further concerns develop, return to the ER  Venus Forman Certified  Physician Assistant  5/17/2018  4:01 PM  URGENT CARE CLINIC      5/17/2018   [facility]     Venus Forman PA  05/17/18 [id number]

## 2018-05-17 NOTE — ED AVS SNAPSHOT
[facility]    [address] [place] [address]    Phone:  [phone]                                       Kevyn Henriquez   MRN: [id number]    Department:  [facility]   Date of Visit:  5/17/2018           Patient Information     Date Of Birth          1950        Your diagnoses for this visit were:     Closed fracture of fifth toe of left foot, initial encounter     Elevated blood pressure reading without diagnosis of hypertension 165/111       You were seen by Venus Forman PA.      Follow-up Information     Follow up with Carmelina Espinoza MD.    Specialty:  Family Practice    Why:  If symptoms worsen or if pain is not resolving in next 3 weeks    Contact information:    [address] [place] [address]  [phone]          Follow up with [facility].    Specialty:  EMERGENCY MEDICINE    Why:  If further concerns develop    Contact information:    [address]  [place] [address] [phone]    Additional information:    From [place] Area: Take [address]. Turn left at [address] [facility] [facility]. Turn left at the first stoplight on [address]. At the first stop sign, take a right onto [address]. Take a left into the parking lot and continue through until you reach the North enterance of the building.       From [place]: Take [address]. Take the [address] ramp towards [place]. Turn left onto [address]. Take a slight right onto [address]. Turn left at the first stoplight on [address]. At the first stop sign, take a right onto [address]. Take a left into the parking lot and continue through until you reach the North enterance of the building.       From [place]: Take [address]. Take a right at [address]. At the first stop sign, take a right onto [address]. Take a left into the parking lot and continue through until you reach the North enterance of the  building.       Discharge References/Attachments     BONES, HOW THEY HEAL (ENGLISH)    FRACTURE, FINGER AND TOE (BROKEN FINGER OR TOE) (ENGLISH)      Your next 10 appointments already scheduled     May 23, 2018  1:30 PM CDT   (Arrive by 1:15 PM)   CONSULT with Marino Arzola MD   [facility]    [address] [place] [address]   [phone]                 Review of your medicines      Our records show that you are taking the medicines listed below. If these are incorrect, please call your family doctor or clinic.        Dose / Directions Last dose taken    aspirin 81 MG tablet   Dose:  81 mg        Take 81 mg by mouth daily   Refills:  0        VITAMIN D-3 PO        Refills:  0                Procedures and tests performed during your visit     XR Toe Left G/E 2 Views      Orders Needing Specimen Collection     None      Pending Results     Date and Time Order Name Status Description    5/17/2018 1143 XR Toe Left G/E 2 Views In process             Pending Culture Results     No orders found from 5/15/2018 to 5/18/2018.            Thank you for choosing [facility]       Thank you for choosing [facility] for your care. Our goal is always to provide you with excellent care. Hearing back from our patients is one way we can continue to improve our services. Please take a few minutes to complete the written survey that you may receive in the mail after you visit with us. Thank you!        [employer] Information     [email or web address] gives you secure access to your electronic health record. If you see a primary care provider, you can also send messages to your care team and make appointments. If you have questions, please call your primary care clinic.  If you do not have a primary care provider, please call [phone] and they will assist you.        Care EveryWhere ID     This is your Care EveryWhere ID. This could be used by other organizations to access your [facility]  records  [id number]        Equal Access to Services     [place] : Hi Correa, karlos rice, belem beatty, es davidson. So [facility] [phone].    ATENCIÓN: Si habla español, tiene a livingston disposición servicios gratuitos de asistencia lingüística. Llame al [phone].    We comply with applicable federal civil rights laws and [place] laws. We do not discriminate on the basis of race, color, national origin, age, disability, sex, sexual orientation, or gender identity.            After Visit Summary       This is your record. Keep this with you and show to your community pharmacist(s) and doctor(s) at your next visit.

## 2018-05-17 NOTE — ED AVS SNAPSHOT
[facility]    [address]    [place] [address]    Phone:  [phone]                                       Kevyn Henriquez   MRN: [id number]    Department:  [facility]   Date of Visit:  5/17/2018           After Visit Summary Signature Page     I have received my discharge instructions, and my questions have been answered. I have discussed any challenges I see with this plan with the nurse or doctor.    ..........................................................................................................................................  Patient/Patient Representative Signature      ..........................................................................................................................................  Patient Representative Print Name and Relationship to Patient    ..................................................               ................................................  Date                                            Time    ..........................................................................................................................................  Reviewed by Signature/Title    ...................................................              ..............................................  Date                                                            Time

## 2018-05-23 ENCOUNTER — OFFICE VISIT (OUTPATIENT)
Dept: SURGERY | Facility: OTHER | Age: 68
End: 2018-05-23
Attending: FAMILY MEDICINE
Payer: MEDICARE

## 2018-05-23 VITALS
HEART RATE: 88 BPM | WEIGHT: 256 LBS | SYSTOLIC BLOOD PRESSURE: 142 MMHG | BODY MASS INDEX: 37.92 KG/M2 | HEIGHT: 69 IN | DIASTOLIC BLOOD PRESSURE: 90 MMHG | TEMPERATURE: 97.8 F | OXYGEN SATURATION: 94 %

## 2018-05-23 DIAGNOSIS — Z12.11 SPECIAL SCREENING FOR MALIGNANT NEOPLASMS, COLON: Primary | ICD-10-CM

## 2018-05-23 PROCEDURE — G0463 HOSPITAL OUTPT CLINIC VISIT: HCPCS | Mod: 25 | Performed by: COUNSELOR

## 2018-05-23 PROCEDURE — G0463 HOSPITAL OUTPT CLINIC VISIT: HCPCS | Performed by: COUNSELOR

## 2018-05-23 PROCEDURE — 99203 OFFICE O/P NEW LOW 30 MIN: CPT | Performed by: SURGERY

## 2018-05-23 PROCEDURE — 93005 ELECTROCARDIOGRAM TRACING: CPT

## 2018-05-23 PROCEDURE — 93010 ELECTROCARDIOGRAM REPORT: CPT | Performed by: INTERNAL MEDICINE

## 2018-05-23 RX ORDER — SODIUM, POTASSIUM,MAG SULFATES 17.5-3.13G
1 SOLUTION, RECONSTITUTED, ORAL ORAL SEE ADMIN INSTRUCTIONS
Qty: 2 BOTTLE | Refills: 0 | Status: SHIPPED | OUTPATIENT
Start: 2018-05-23 | End: 2018-06-07

## 2018-05-23 ASSESSMENT — PAIN SCALES - GENERAL: PAINLEVEL: NO PAIN (0)

## 2018-05-23 NOTE — PATIENT INSTRUCTIONS
EKG today in clinic X-Ray     Guide to your Colonoscopy with Suprep       Date of Procedure 6/25/18 with Dr. Arzola    Admit time: Surgery Department will call you the day before your procedure by 5pm with your admit time. If your surgery is on Monday, please expect a call on Friday.    If we were unable to reach you before 5PM, you may call admitting at [phone] or toll free at [phone]    Please call the Registered Nurse in Surgery Education at [phone] or toll free at [phone] one to two weeks before your procedure and have an allergy and medication list ready        YOUR UPCOMING COLONOSCOPY    At [facility], we want to make sure that your colonoscopy is as pleasant as possible. This guide is designed to answer any questions you might have and to walk you through the preparations you will need to make before your procedure.    Should you have additional questions, please feel free to contact us. Contact numbers are listed below. Thank you for choosing [facility].    Important Numbers    Clinic Health Unit Coordinator: [phone]  Clinic Nurse: [phone] (or [phone]; [phone])  Surgery Education Nurse: [phone] or toll free [phone]    All nursing questions or concerns can be directed to the clinic or surgery education nurse    If you have a scheduling or appointment question, or need to postoone your procedure, please call the Health Unit Coordinator between 8am and 4pm Monday through Friday.    After hours or on weekends, please call [phone] to postpone.             COLONOSCOPY PREP    7 DAYS BEFORE THE EXAM:     Do not take Aspirin or other NSAIDS (Ibuprofen, Motrin, Aleve, Celebrex, Naproxen, etc) 7 days before your surgery. Tylenol is fine.    If you are prescribed blood thinners (Aspirin, Coumadin/Warfarin, Plavix, etc) talk to your provider.    Stop taking fiber supplements, vitamins, iron or multivitamins that contain  iron.    Do not eat any nuts or seeds.    If you are a diabetic and take medications to control your blood sugar, follow the special instructions listed or talk to your provider.     Arrange transportation with a family member or friend to drive you home and have an adult available to stay with you for the next 4 hours when you arrive home for your safety. If you need to take a taxi or the bus, you MUST have a responsible adult to ride with you OR YOUR PROCEDURE WILL BE CANCELLED. It is recommended that you DO NOT DRIVE for the next 24 hours after receiving anesthesia.      prescriptions at your pharmacy as soon as possible. If it has been more than one week since your appointment was scheduled, please call your pharmacy to verify it is still ready for .     Call the Surgery Education Nurse if you should become ill within 1 week of your procedure and we will reschedule it when you are healthy. This includes sings or symptoms of a cold or the flu. This can include fever, chills, sore throat, cough, chest congestions, productive cough, runny nose.     2 DAYS BEFORE THE EXAM:   Begin a low fiber diet. No raw fruits or vegatables, whole grains, nuts, seeds, popcorn, or other high-fiber foods. No bulking agents (metamucil, Fibercon, etc) and no Olestra (fat substitute).     Drink at least 4-6 large glasses of sports drink each day (not red or purple).    1 DAY BEFORE THE EXAM:    DO NOT EAT ANY SOLID FOOD OR MILK PRODUCTS AFTER 12:00 AM (MIDNIGHT).  Drink only clear liquids for breakfast, lunch and dinner. (No red or purple colors as these colors can be mistaken for blood.)    Clear liquids include water, flavored water, coconut water, juices without pulp, soft drinks, broth, bouillon, black coffee without cream, tea, Cruzito-Aid, Propel, Gatorade, Clear nutrition drinks (Resource Breeze, Ensure Active Protein peach flavor) Jell-O and popsicles.   Follow the instructions of your colon preparation.    No red or  purple colors, milk products, or alcohol.        AT 6:00 PM THE EVENING PRIOR TO PROCEDURE:  Pour one 6 ounce bottle of SUPREP liquid into the mixing container.  Add cool drinking water to the 16 ounce line on the container and mix.   Note: Be sure to dilute SUPREP before you drink it.  Drink ALL the liquid in the container.    You must drink 2 or more 16 ounce containers of water over the next hour.  Stay near a toilet while using this medication.      6 HOURS PRIOR TO THE EXAM:  Pour the 2nd 6 ounce bottle of SUPREP liquid into the mixing container.  Add cool drinking water to the 16 ounce line on the container and mix.   Note: Be sure to dilute SUPREP before you drink it.  Drink ALL the liquid in the container.    You must drink 2 or more 16 ounce containers of water over the next hour.  You should be done with with prep 4 hours before the exam.    You may continue clear liquids until 3 hours prior to exam.     If you must take medication, take it with a sip of water.  Do not take diabetes medicine by mouth until after your exam.  If you have asthma, bring your inhaler to surgery.      DAY OF COLONOSCOPY PROCEDURE:     You many have clear liquids up until 3 hours before you check in at admitting.  Wear comfortable clothes. No jewelry, body piercings, make-up, nail polish, hair spray, lotions, perfumes or colognes. Shower before you arrive.  Take blood pressure and heart medications as usual with a sip of water.  [place] in Admitting through the [facility].  You must have a  with you and and adult available to stay with your for 4 hours at home. The medicine used in this test will make you sleepy. If you do not have someone, please reschedule or your test will be cancelled.  It is recommended that you do not drive for 24 hours after your test. Do not operate power equipment, drink alcoholic beverages, make important decisions or sign legal documents.     COLONOSCOPY FREQUENTLY ASKED QUESTIONS    What is  "a colonoscopy?    A colonoscopy is a test to look at the lining of your large intestine. The purpose of the exam is to check for abnormalities including growths called \"polyps\" that can lead to serious disease. A flexibles scope is inserted into your rectum by the doctor to examine your large intestine.    What are polyps?  Polyps are abnormal growths on the lining of the colon. Most polyps are not cancerous, but some polyps have the potential to turn into cancer with time. Polyps can also bleed. For these reasons, most polyps are removed during a colonoscopy and sent to the laboratory for microscopic examination.    What preparation is needed?  The colon must be completely clean for the procedure to be performed. You may be given one or two different prep solutions to cleanse your bowel. You will also need to follow a clear liquid diet the day before your procedure.    What happens after the procedure?  After your procedure is complete, you will be taken back to your day surgery room where you will be monitored for approximately 1 hour. You can expect to feel drowsy for several hours afterward. You may experience some cramping or bloating due to the air introduced into your colon during the exam. You will not be able to drive or operate machinery the rest of the day. You will be given written discharge instructions and appropriate learning material before you go home. You must have an adult to stay at home with you for the next 4 hours after you leave the hospital for your safety.    When will I find out the results of my test?  Your surgeon will talk to you and your designated  before you leave and usually the preliminary results can be given to you at that time. If a biopsy was taken during your procedure, it will be sent to the laboratory for examination. Results usually take one week. You will be contacted by phone or by letter with results.      TIPS FOR COLON CLEANSING BEFORE YOUR COLONOSCOPY    To get " accurate results from your exam, your colon must be completely clean and empty. Please follow your doctor's instructions. If you do not, you may need to repeat both the exam and colon-cleansing process.    The medicine you take may cause bloating, nausea and other discomfort. Follow these tips to make the process as easy as possible:     You may use alcohol-free baby wipes to ease anal irritation. You may also use Vaseline to help protect the skin. Other options include Tucks wipes, hemorrhoid treatments and hydrocortisone cream.     You may wish to squeeze some lemon juice into your preparation or add a packet of Crystal Light lemon-lime or ice tea flavor. (remember to not use red or purple)    To chill the solution, put it in your refrigerator or set it in a bowl of ice. Do not add ice in your drinking glass. You may remove the colon preparation from the refrigerator 15-30 minutes before drinking.    Quickly drink one whole glass every 5 to 10 minutes. It may help to use a timer. If the liquid is too salty, use a straw.    Stay near a toilet!    You will have diarrhea (loose watery stools) and may also have chills. Dress for comfort.    Expect to feel discomfort until the stool clears from your colon. This usually takes about 2 to 4 hours.    Even when you are sitting on the toilet, keep drinking a glass of solution every 10-15 minutes.    If you have nausea or vomiting, rinse your mouth with water. Take a break for 15 to 30 minutes, and then keep drinking the solution.    Some people find it helpful to suck on a wedge of lemon or lime. You may also try sucking on hard candy (not red or purple) or washing your mouth out with water, clear soda or mouthwash.    If you followed your doctor's orders and your stool is a clear or yellow liquid, you are ready for the exam.    If you are not sure if your colon is clean, please call your clinic and ask to speak to a nurse.

## 2018-05-23 NOTE — NURSING NOTE
"Chief Complaint   Patient presents with     Consult     Olga Paredes referring       Initial BP (!) 156/101 (BP Location: Right arm, Patient Position: Sitting, Cuff Size: Adult Regular)  Pulse 88  Temp 97.8  F (36.6  C) (Tympanic)  Ht 5' 9\" (1.753 m)  Wt 256 lb (116.1 kg)  SpO2 94%  BMI 37.8 kg/m2 Estimated body mass index is 37.8 kg/(m^2) as calculated from the following:    Height as of this encounter: 5' 9\" (1.753 m).    Weight as of this encounter: 256 lb (116.1 kg).  Medication Reconciliation: complete    Debbie Plaza LPN    "

## 2018-05-23 NOTE — MR AVS SNAPSHOT
After Visit Summary   5/23/2018    Kevyn Henriquez    MRN: [id number]           Patient Information     Date Of Birth          1950        Visit Information        Provider Department      5/23/2018 1:30 PM Marino Arzola MD [facility] [place]        Today's Diagnoses     Special screening for malignant neoplasms, colon    -  1      Care Instructions    EKG today in clinic X-Ray     Guide to your Colonoscopy with Suprep       Date of Procedure 6/25/18 with Dr. Arzola    Admit time: Surgery Department will call you the day before your procedure by 5pm with your admit time. If your surgery is on Monday, please expect a call on Friday.    If we were unable to reach you before 5PM, you may call admitting at [phone] or toll free at [phone] ext [phone]    Please call the Registered Nurse in Surgery Education at [phone] or toll free at [phone] one to two weeks before your procedure and have an allergy and medication list ready        YOUR UPCOMING COLONOSCOPY    At [facility], we want to make sure that your colonoscopy is as pleasant as possible. This guide is designed to answer any questions you might have and to walk you through the preparations you will need to make before your procedure.    Should you have additional questions, please feel free to contact us. Contact numbers are listed below. Thank you for choosing [facility].    Important Numbers    Clinic Health Unit Coordinator: [phone]  Clinic Nurse: [phone] (or [phone]; [phone])  Surgery Education Nurse: [phone] or toll free [phone]    All nursing questions or concerns can be directed to the clinic or surgery education nurse    If you have a scheduling or appointment question, or need to postoone your procedure, please call the Health Unit Coordinator between 8am and 4pm Monday through Friday.    After hours or on weekends, please call [phone] to postpone.              COLONOSCOPY PREP    7 DAYS BEFORE THE EXAM:     Do not take Aspirin or other NSAIDS (Ibuprofen, Motrin, Aleve, Celebrex, Naproxen, etc) 7 days before your surgery. Tylenol is fine.    If you are prescribed blood thinners (Aspirin, Coumadin/Warfarin, Plavix, etc) talk to your provider.    Stop taking fiber supplements, vitamins, iron or multivitamins that contain iron.    Do not eat any nuts or seeds.    If you are a diabetic and take medications to control your blood sugar, follow the special instructions listed or talk to your provider.     Arrange transportation with a family member or friend to drive you home and have an adult available to stay with you for the next 4 hours when you arrive home for your safety. If you need to take a taxi or the bus, you MUST have a responsible adult to ride with you OR YOUR PROCEDURE WILL BE CANCELLED. It is recommended that you DO NOT DRIVE for the next 24 hours after receiving anesthesia.      prescriptions at your pharmacy as soon as possible. If it has been more than one week since your appointment was scheduled, please call your pharmacy to verify it is still ready for .     Call the Surgery Education Nurse if you should become ill within 1 week of your procedure and we will reschedule it when you are healthy. This includes sings or symptoms of a cold or the flu. This can include fever, chills, sore throat, cough, chest congestions, productive cough, runny nose.     2 DAYS BEFORE THE EXAM:   Begin a low fiber diet. No raw fruits or vegatables, whole grains, nuts, seeds, popcorn, or other high-fiber foods. No bulking agents (metamucil, Fibercon, etc) and no Olestra (fat substitute).     Drink at least 4-6 large glasses of sports drink each day (not red or purple).    1 DAY BEFORE THE EXAM:    DO NOT EAT ANY SOLID FOOD OR MILK PRODUCTS AFTER 12:00 AM (MIDNIGHT).  Drink only clear liquids for breakfast, lunch and dinner. (No red or purple colors as  these colors can be mistaken for blood.)    Clear liquids include water, flavored water, coconut water, juices without pulp, soft drinks, broth, bouillon, black coffee without cream, tea, Cruzito-Aid, Propel, Gatorade, Clear nutrition drinks (Resource Breeze, Ensure Active Protein peach flavor) Jell-O and popsicles.   Follow the instructions of your colon preparation.    No red or purple colors, milk products, or alcohol.        AT 6:00 PM THE EVENING PRIOR TO PROCEDURE:  Pour one 6 ounce bottle of SUPREP liquid into the mixing container.  Add cool drinking water to the 16 ounce line on the container and mix.   Note: Be sure to dilute SUPREP before you drink it.  Drink ALL the liquid in the container.    You must drink 2 or more 16 ounce containers of water over the next hour.  Stay near a toilet while using this medication.      6 HOURS PRIOR TO THE EXAM:  Pour the 2nd 6 ounce bottle of SUPREP liquid into the mixing container.  Add cool drinking water to the 16 ounce line on the container and mix.   Note: Be sure to dilute SUPREP before you drink it.  Drink ALL the liquid in the container.    You must drink 2 or more 16 ounce containers of water over the next hour.  You should be done with with prep 4 hours before the exam.    You may continue clear liquids until 3 hours prior to exam.     If you must take medication, take it with a sip of water.  Do not take diabetes medicine by mouth until after your exam.  If you have asthma, bring your inhaler to surgery.      DAY OF COLONOSCOPY PROCEDURE:     You many have clear liquids up until 3 hours before you check in at admitting.  Wear comfortable clothes. No jewelry, body piercings, make-up, nail polish, hair spray, lotions, perfumes or colognes. Shower before you arrive.  Take blood pressure and heart medications as usual with a sip of water.  [place] in Admitting through the [place] Entrance.  You must have a  with you and and adult available to stay with your for  "4 hours at home. The medicine used in this test will make you sleepy. If you do not have someone, please reschedule or your test will be cancelled.  It is recommended that you do not drive for 24 hours after your test. Do not operate power equipment, drink alcoholic beverages, make important decisions or sign legal documents.     COLONOSCOPY FREQUENTLY ASKED QUESTIONS    What is a colonoscopy?    A colonoscopy is a test to look at the lining of your large intestine. The purpose of the exam is to check for abnormalities including growths called \"polyps\" that can lead to serious disease. A flexibles scope is inserted into your rectum by the doctor to examine your large intestine.    What are polyps?  Polyps are abnormal growths on the lining of the colon. Most polyps are not cancerous, but some polyps have the potential to turn into cancer with time. Polyps can also bleed. For these reasons, most polyps are removed during a colonoscopy and sent to the laboratory for microscopic examination.    What preparation is needed?  The colon must be completely clean for the procedure to be performed. You may be given one or two different prep solutions to cleanse your bowel. You will also need to follow a clear liquid diet the day before your procedure.    What happens after the procedure?  After your procedure is complete, you will be taken back to your day surgery room where you will be monitored for approximately 1 hour. You can expect to feel drowsy for several hours afterward. You may experience some cramping or bloating due to the air introduced into your colon during the exam. You will not be able to drive or operate machinery the rest of the day. You will be given written discharge instructions and appropriate learning material before you go home. You must have an adult to stay at home with you for the next 4 hours after you leave the hospital for your safety.    When will I find out the results of my test?  Your " surgeon will talk to you and your designated  before you leave and usually the preliminary results can be given to you at that time. If a biopsy was taken during your procedure, it will be sent to the laboratory for examination. Results usually take one week. You will be contacted by phone or by letter with results.      TIPS FOR COLON CLEANSING BEFORE YOUR COLONOSCOPY    To get accurate results from your exam, your colon must be completely clean and empty. Please follow your doctor's instructions. If you do not, you may need to repeat both the exam and colon-cleansing process.    The medicine you take may cause bloating, nausea and other discomfort. Follow these tips to make the process as easy as possible:     You may use alcohol-free baby wipes to ease anal irritation. You may also use Vaseline to help protect the skin. Other options include Tucks wipes, hemorrhoid treatments and hydrocortisone cream.     You may wish to squeeze some lemon juice into your preparation or add a packet of Crystal Light lemon-lime or ice tea flavor. (remember to not use red or purple)    To chill the solution, put it in your refrigerator or set it in a bowl of ice. Do not add ice in your drinking glass. You may remove the colon preparation from the refrigerator 15-30 minutes before drinking.    Quickly drink one whole glass every 5 to 10 minutes. It may help to use a timer. If the liquid is too salty, use a straw.    Stay near a toilet!    You will have diarrhea (loose watery stools) and may also have chills. Dress for comfort.    Expect to feel discomfort until the stool clears from your colon. This usually takes about 2 to 4 hours.    Even when you are sitting on the toilet, keep drinking a glass of solution every 10-15 minutes.    If you have nausea or vomiting, rinse your mouth with water. Take a break for 15 to 30 minutes, and then keep drinking the solution.    Some people find it helpful to suck on a wedge of lemon or  "lime. You may also try sucking on hard candy (not red or purple) or washing your mouth out with water, clear soda or mouthwash.    If you followed your doctor's orders and your stool is a clear or yellow liquid, you are ready for the exam.    If you are not sure if your colon is clean, please call your clinic and ask to speak to a nurse.                 Follow-ups after your visit        Who to contact     If you have questions or need follow up information about today's clinic visit or your schedule please contact [facility] directly at [phone].  Normal or non-critical lab and imaging results will be communicated to you by [employer], letter or phone within 4 business days after the clinic has received the results. If you do not hear from us within 7 days, please contact the clinic through [employer] or phone. If you have a critical or abnormal lab result, we will notify you by phone as soon as possible.  Submit refill requests through [employer] or call your pharmacy and they will forward the refill request to us. Please allow 3 business days for your refill to be completed.          Additional Information About Your Visit        MyChart Information     [employer] gives you secure access to your electronic health record. If you see a primary care provider, you can also send messages to your care team and make appointments. If you have questions, please call your primary care clinic.  If you do not have a primary care provider, please call [phone] and they will assist you.        Care EveryWhere ID     This is your Care EveryWhere ID. This could be used by other organizations to access your [facility] medical records  [id number]        Your Vitals Were     Pulse Temperature Height Pulse Oximetry BMI (Body Mass Index)       88 97.8  F (36.6  C) (Tympanic) 5' 9\" (1.753 m) 94% 37.8 kg/m2        Blood Pressure from Last 3 Encounters:   05/23/18 (!) 156/101   05/17/18 (!) 165/111   05/02/18 132/88    Weight " from Last 3 Encounters:   05/23/18 256 lb (116.1 kg)   05/02/18 256 lb (116.1 kg)   01/08/18 231 lb (104.8 kg)              We Performed the Following     EKG 12-lead complete with read (future)        Primary Care Provider Office Phone # Fax #    Carmelina Espinoza [phone]       [address]  [facility] [address]        Equal Access to Services     [facility]: Hadii aad ku hadasho Soomaali, waaxda luqadaha, qaybta kaalmada adeegyada, waxay idiin hayaan adeeg kharash la'trever . So [facility] [phone].    ATENCIÓN: Si habla español, tiene a livingston disposición servicios gratuitos de asistencia lingüística. [facility] [phone].    We comply with applicable federal civil rights laws and [place] laws. We do not discriminate on the basis of race, color, national origin, age, disability, sex, sexual orientation, or gender identity.            Thank you!     Thank you for choosing [facility]  for your care. Our goal is always to provide you with excellent care. Hearing back from our patients is one way we can continue to improve our services. Please take a few minutes to complete the written survey that you may receive in the mail after your visit with us. Thank you!             Your Updated Medication List - Protect others around you: Learn how to safely use, store and throw away your medicines at www.disposemymeds.org.          This list is accurate as of 5/23/18  1:54 PM.  Always use your most recent med list.                   Brand Name Dispense Instructions for use Diagnosis    aspirin 81 MG tablet      Take 81 mg by mouth daily        VITAMIN D-3 PO

## 2018-05-24 NOTE — PROGRESS NOTES
[facility] Surgery Consultation    CC:  Colorectal cancer screening    HPI:  This 67 year old year old male is seen at the request of Carmelina Espinoza for evaluation of colorectal cancer screening.  The history is obtained from the patient, and reviewing the medical record.  He is good medical historian. His brother was diagnosed with colon cancer and he has never undergone a previous colonoscopy.  He says that he attributed his brother's colon cancer to chewing tobacco.  There is no other family members with a history of colon cancer.  He does state that his mother had a history of benign colonic polyps.  He has not had any abdominal pain, cramping, diarrhea, constipation, hematochezia, melena.    Past Medical History:   Diagnosis Date     Tobacco abuse        Past Surgical History:   Procedure Laterality Date     EXCISE GANGLION WRIST Left 4/30/2015    Procedure: EXCISE GANGLION WRIST;  Surgeon: Phyllis Tuttle MD;  Location: [facility] OR     EXCISE MASS NECK N/A 4/30/2015    Procedure: EXCISE MASS NECK;  Surgeon: Phyllis Tuttle MD;  Location: [facility] OR     HC EXCIS PRIMARY GANGLION WRIST      left wrist       Pt denied problems with bleeding or anesthesia    Prior to Admission medications    Medication Sig Start Date End Date Taking? Authorizing Provider   Na Sulfate-K Sulfate-Mg Sulf (SUPREP BOWEL PREP KIT) solution Take 177 mLs (1 Bottle) by mouth See Admin Instructions 5/23/18  Yes Marino Arzola MD   aspirin 81 MG tablet Take 81 mg by mouth daily   Yes Reported, Patient   Cholecalciferol (VITAMIN D-3 PO)    Yes Reported, Patient        No Known Allergies      HABITS:    Social History   Substance Use Topics     Smoking status: Current Every Day Smoker     Packs/day: 0.50     Types: Cigarettes     Smokeless tobacco: Never Used     Alcohol use No     No mood altering drug use.    Family History   Problem Relation Age of Onset     DIABETES Mother      borderline diabetes     HEART DISEASE Father       "Hypertension Father      Hypertension Brother      Hypertension Brother        REVIEW OF SYSTEMS:  Ten point review of systems negative except those mentioned in the HPI.     The patient denies sleep apnea, latex allergies or MRSA    OBJECTIVE:    /90 (BP Location: Right arm, Patient Position: Sitting, Cuff Size: Adult Large)  Pulse 88  Temp 97.8  F (36.6  C) (Tympanic)  Ht 5' 9\" (1.753 m)  Wt 256 lb (116.1 kg)  SpO2 94%  BMI 37.8 kg/m2    GENERAL: Generally appears well, in no distress with appropriate affect.  HEENT:   Sclerae anicteric - No cervical, supra/infraclavicular lymphadenopathy, no thyroid masses  Respiratory:  Lungs clear to ausculation bilaterally with good air excursion  Cardiovascular:  Regular Rate and Rhythm with no murmurs gallops or rubs, normal   Abdomen: obese, soft, NT/ND  :  deferred  Extremities:  Extremities normal. No deformities, edema, or skin discoloration.  Skin:  no suspicious lesions or rashes  Neurological: grossly intact    Psych:  Alert, oriented, affect appropriate with normal decision making ability.      IMPRESSION:  67 year old male with a family history of colon cancer in for his first colonoscopy    PLAN:  A detailed description of the [place] Preventive Task Force development of colorectal cancer screening was had. I described the pathology of the adenoma to carcinoma progression and its genetic changes that occur. I discussed how with colorectal cancer screening by endoscopic surveillance we are able to identify potential malignancies and remove them before they progress along the adenoma to carcinoma pathway. The risks for colon cancer progression were discussed including first degree relatives with colon cancer, inflammatory bowel disease, smoking, obesity, and diet. I then discussed how there are certain attributes which can decrease the risk of colon cancer such as a healthy diet and physical activity. The patient understood the adenoma to " carcinoma sequence, the reasoning for screening at specific intervals, and risk factor modification. I then described the technical portion of the procedure.    The indications, risks, benefits and technical aspects of whole colon colonoscopy were outlined with risks including, but not limited to, perforation, bleeding and inability to visualize entire colon.  Management of each was reviewed.  The need of mechanical preparation of the colon was reviewed along with the use of monitored anesthetic care.  The patient's questions were asked and answered.  Scheduled first available date.          Marino Arzola MD    5/23/2018  10:16 PM    cc:  Carmelina Espinoza

## 2018-06-05 ENCOUNTER — TELEPHONE (OUTPATIENT)
Dept: SURGERY | Facility: OTHER | Age: 68
End: 2018-06-05

## 2018-06-05 DIAGNOSIS — Z12.11 SPECIAL SCREENING FOR MALIGNANT NEOPLASMS, COLON: Primary | ICD-10-CM

## 2018-06-05 NOTE — TELEPHONE ENCOUNTER
Patient left voicemail stating that he went to the pharmacy to  his prep and it was too expensive. Please call patient back for an alternative prep for his colonoscopy on 6-25-18 with Dr Arzola. Patient can be reached at [phone].

## 2018-06-07 ENCOUNTER — TELEPHONE (OUTPATIENT)
Dept: SURGERY | Facility: OTHER | Age: 68
End: 2018-06-07

## 2018-06-07 NOTE — TELEPHONE ENCOUNTER
Patient called and is looking at an alternate prep than what was prescribed for his colonoscopy on 6-25-18 with Dr Arzola. Please call patient at [phone].

## 2018-06-07 NOTE — TELEPHONE ENCOUNTER
Patient notified that Dr. Arzola prescribed Golytely on 6/5. Bowel prep instructions mailed to patient.

## 2018-06-21 ENCOUNTER — ANESTHESIA EVENT (OUTPATIENT)
Dept: SURGERY | Facility: HOSPITAL | Age: 68
End: 2018-06-21
Payer: MEDICARE

## 2018-06-25 ENCOUNTER — HOSPITAL ENCOUNTER (OUTPATIENT)
Facility: HOSPITAL | Age: 68
Discharge: HOME | End: 2018-06-25
Attending: SURGERY | Admitting: SURGERY
Payer: MEDICARE

## 2018-06-25 ENCOUNTER — ANESTHESIA (OUTPATIENT)
Dept: SURGERY | Facility: HOSPITAL | Age: 68
End: 2018-06-25
Payer: MEDICARE

## 2018-06-25 ENCOUNTER — SURGERY (OUTPATIENT)
Age: 68
End: 2018-06-25

## 2018-06-25 VITALS
RESPIRATION RATE: 18 BRPM | OXYGEN SATURATION: 94 % | DIASTOLIC BLOOD PRESSURE: 100 MMHG | BODY MASS INDEX: 35.44 KG/M2 | TEMPERATURE: 97.3 F | WEIGHT: 240 LBS | SYSTOLIC BLOOD PRESSURE: 132 MMHG

## 2018-06-25 PROCEDURE — 36000050 ZZH SURGERY LEVEL 2 1ST 30 MIN: Performed by: SURGERY

## 2018-06-25 PROCEDURE — 71000027 ZZH RECOVERY PHASE 2 EACH 15 MINS: Performed by: SURGERY

## 2018-06-25 PROCEDURE — 88305 TISSUE EXAM BY PATHOLOGIST: CPT | Mod: TC | Performed by: SURGERY

## 2018-06-25 PROCEDURE — 27210794 ZZH OR GENERAL SUPPLY STERILE: Performed by: SURGERY

## 2018-06-25 PROCEDURE — 37000009 ZZH ANESTHESIA TECHNICAL FEE, EACH ADDTL 15 MIN: Performed by: SURGERY

## 2018-06-25 PROCEDURE — 25000128 H RX IP 250 OP 636: Performed by: NURSE ANESTHETIST, CERTIFIED REGISTERED

## 2018-06-25 PROCEDURE — 37000008 ZZH ANESTHESIA TECHNICAL FEE, 1ST 30 MIN: Performed by: SURGERY

## 2018-06-25 PROCEDURE — 40000306 ZZH STATISTIC PRE PROC ASSESS II: Performed by: SURGERY

## 2018-06-25 PROCEDURE — 36000052 ZZH SURGERY LEVEL 2 EA 15 ADDTL MIN: Performed by: SURGERY

## 2018-06-25 PROCEDURE — 45385 COLONOSCOPY W/LESION REMOVAL: CPT | Performed by: NURSE ANESTHETIST, CERTIFIED REGISTERED

## 2018-06-25 PROCEDURE — 45380 COLONOSCOPY AND BIOPSY: CPT | Mod: PT | Performed by: SURGERY

## 2018-06-25 RX ORDER — ONDANSETRON 4 MG/1
4 TABLET, ORALLY DISINTEGRATING ORAL EVERY 30 MIN PRN
Status: DISCONTINUED | OUTPATIENT
Start: 2018-06-25 | End: 2018-06-25 | Stop reason: HOSPADM

## 2018-06-25 RX ORDER — NALOXONE HYDROCHLORIDE 0.4 MG/ML
.1-.4 INJECTION, SOLUTION INTRAMUSCULAR; INTRAVENOUS; SUBCUTANEOUS
Status: DISCONTINUED | OUTPATIENT
Start: 2018-06-25 | End: 2018-06-25 | Stop reason: HOSPADM

## 2018-06-25 RX ORDER — PROPOFOL 10 MG/ML
INJECTION, EMULSION INTRAVENOUS PRN
Status: DISCONTINUED | OUTPATIENT
Start: 2018-06-25 | End: 2018-06-25

## 2018-06-25 RX ORDER — LIDOCAINE 40 MG/G
CREAM TOPICAL
Status: DISCONTINUED | OUTPATIENT
Start: 2018-06-25 | End: 2018-06-25 | Stop reason: HOSPADM

## 2018-06-25 RX ORDER — LABETALOL HYDROCHLORIDE 5 MG/ML
10 INJECTION, SOLUTION INTRAVENOUS
Status: DISCONTINUED | OUTPATIENT
Start: 2018-06-25 | End: 2018-06-25 | Stop reason: HOSPADM

## 2018-06-25 RX ORDER — FLUMAZENIL 0.1 MG/ML
0.2 INJECTION, SOLUTION INTRAVENOUS
Status: DISCONTINUED | OUTPATIENT
Start: 2018-06-25 | End: 2018-06-25 | Stop reason: HOSPADM

## 2018-06-25 RX ORDER — FENTANYL CITRATE 50 UG/ML
25-50 INJECTION, SOLUTION INTRAMUSCULAR; INTRAVENOUS
Status: DISCONTINUED | OUTPATIENT
Start: 2018-06-25 | End: 2018-06-25 | Stop reason: HOSPADM

## 2018-06-25 RX ORDER — HYDRALAZINE HYDROCHLORIDE 20 MG/ML
2.5-5 INJECTION INTRAMUSCULAR; INTRAVENOUS EVERY 10 MIN PRN
Status: DISCONTINUED | OUTPATIENT
Start: 2018-06-25 | End: 2018-06-25 | Stop reason: HOSPADM

## 2018-06-25 RX ORDER — ONDANSETRON 2 MG/ML
4 INJECTION INTRAMUSCULAR; INTRAVENOUS EVERY 30 MIN PRN
Status: DISCONTINUED | OUTPATIENT
Start: 2018-06-25 | End: 2018-06-25 | Stop reason: HOSPADM

## 2018-06-25 RX ORDER — MEPERIDINE HYDROCHLORIDE 50 MG/ML
12.5 INJECTION INTRAMUSCULAR; INTRAVENOUS; SUBCUTANEOUS
Status: DISCONTINUED | OUTPATIENT
Start: 2018-06-25 | End: 2018-06-25 | Stop reason: HOSPADM

## 2018-06-25 RX ORDER — ALBUTEROL SULFATE 0.83 MG/ML
2.5 SOLUTION RESPIRATORY (INHALATION) EVERY 4 HOURS PRN
Status: DISCONTINUED | OUTPATIENT
Start: 2018-06-25 | End: 2018-06-25 | Stop reason: HOSPADM

## 2018-06-25 RX ORDER — SODIUM CHLORIDE, SODIUM LACTATE, POTASSIUM CHLORIDE, CALCIUM CHLORIDE 600; 310; 30; 20 MG/100ML; MG/100ML; MG/100ML; MG/100ML
INJECTION, SOLUTION INTRAVENOUS CONTINUOUS
Status: DISCONTINUED | OUTPATIENT
Start: 2018-06-25 | End: 2018-06-25 | Stop reason: HOSPADM

## 2018-06-25 RX ADMIN — PROPOFOL 100 MG: 10 INJECTION, EMULSION INTRAVENOUS at 10:35

## 2018-06-25 RX ADMIN — PROPOFOL 50 MG: 10 INJECTION, EMULSION INTRAVENOUS at 10:37

## 2018-06-25 RX ADMIN — PROPOFOL 50 MG: 10 INJECTION, EMULSION INTRAVENOUS at 10:44

## 2018-06-25 RX ADMIN — PROPOFOL 50 MG: 10 INJECTION, EMULSION INTRAVENOUS at 10:49

## 2018-06-25 RX ADMIN — PROPOFOL 50 MG: 10 INJECTION, EMULSION INTRAVENOUS at 10:40

## 2018-06-25 RX ADMIN — SODIUM CHLORIDE, POTASSIUM CHLORIDE, SODIUM LACTATE AND CALCIUM CHLORIDE 1000 ML: 600; 310; 30; 20 INJECTION, SOLUTION INTRAVENOUS at 09:23

## 2018-06-25 RX ADMIN — PROPOFOL 50 MG: 10 INJECTION, EMULSION INTRAVENOUS at 10:53

## 2018-06-25 ASSESSMENT — LIFESTYLE VARIABLES: TOBACCO_USE: 1

## 2018-06-25 NOTE — H&P
Surgery Consult Clinic Note      RE: Kevny Henriquez  : 1950        Chief Complaint:  Colon cancer screening    History of Present Illness:  Dr. Arzola originally saw Mr. Henriquez on 18 for evaluation regarding screening colonoscopy.  Please refer to that note for further detail.  This is his first colonoscopy.  His brother has a history of colon cancer and his mother has a history of colon polyps.  He reports approximately three weeks of diarrhea in 2018 which has since resolved.  Denies changes in bowel habits, continued diarrhea, constipation, or blood in his stools.  He is here today to update his H&P.  Kevyn is scheduled for colonoscopy on 18, which is outside the 30 day anesthesia clearance guidelines.  This was done because of scheduling availability.  He has no questions regarding  bowel prep.  Reports passing clear liquid stools today.  He specifically denies fevers, chills, nausea, vomiting, chest pain, shortness of breath, palpitations, sore throat, cough, or generalized feeling ill.      Medical history:  Past Medical History:   Diagnosis Date     Tobacco abuse        Surgical history:  Past Surgical History:   Procedure Laterality Date     EXCISE GANGLION WRIST Left 2015    Procedure: EXCISE GANGLION WRIST;  Surgeon: Phyllis Tuttle MD;  Location: [facility]     EXCISE MASS NECK N/A 2015    Procedure: EXCISE MASS NECK;  Surgeon: Phyllis Tuttle MD;  Location: [facility] OR     HC EXCIS PRIMARY GANGLION WRIST      left wrist       Family history:  Family History   Problem Relation Age of Onset     Diabetes Mother      borderline diabetes     HEART DISEASE Father      Hypertension Father      Hypertension Brother      Hypertension Brother        Medications:  Prior to Admission medications    Medication Sig Start Date End Date Taking? Authorizing Provider   aspirin 81 MG tablet Take 81 mg by mouth daily    Reported, Patient   Cholecalciferol (VITAMIN D-3 PO)     Reported,  Patient   polyethylene glycol (GOLYTELY/NULYTELY) 236 g suspension At 6 pm day before your procedure mix your gallon jug and drink half, on morning of procedure at 4 am drink the other half of the gallon. 6/5/18   Marino Arzola MD     Allergies:  The patient has No Known Allergies.  .  Social history:  Social History   Substance Use Topics     Smoking status: Current Every Day Smoker     Packs/day: 0.50     Types: Cigarettes     Smokeless tobacco: Never Used     Alcohol use No     Marital status: .      Review of Systems:    Constitutional: Negative for fever, chills and weight loss.   HENT: Negative for ear pain, nosebleeds, congestion, sore throat, tinnitus and ear discharge.    Eyes: Negative for blurred vision, double vision, photophobia and pain.   Respiratory: Negative for cough, hemoptysis, shortness of breath, wheezing and stridor.    Cardiovascular: Negative for chest pain, palpitations and orthopnea.   Gastrointestinal: Negative for heartburn, nausea, vomiting, abdominal pain and blood in stool.   Genitourinary: Negative for urgency, frequency and hematuria.   Musculoskeletal: Negative for myalgias, back pain and joint pain.   Neurological: Negative for tingling, speech change and headaches.   Endo/Heme/Allergies: Does not bruise/bleed easily.   Psychiatric/Behavioral: Negative for depression, suicidal ideas and hallucinations. The patient is not nervous/anxious.    Physical Examination:  /95  Temp 97.3  F (36.3  C) (Oral)  Resp 16  Wt 108.9 kg (240 lb)  SpO2 93%  BMI 35.44 kg/m2  General: Alert and orientedx4, no acute distress, well-developed/well-nourished, ambulating without assistance  HEENT: normocephalic atraumatic, extraocular movements intact, PERRL Sclerae anicteric; Trachea mideline, no JVD  Chest:   Clear to auscultation bilaterally.  Cardiac: S1S2 , regular rate and rhythm without additional sounds  Abdomen: Obese, soft, non-tender, non-distended  Extremities: Cursory  exam unremarkable.  No peripheral edema noted.  Skin: Warm, dry, < 2 sec cap refill  Neuro: CN 2-12 grossly intact, no focal deficit, GCS 15  Psych: Pleasant, calm, asks appropriate questions      Assessment/Plan:  #1 Screening colon malignant neoplasm  #2 Family history of colon cancer in first degree relative  #3 Family history of colon polyps in first degree relative    Kevyn Henriquez and I had a discussion about colonoscopies.  The indications, risks, benefits, althernatives and technical aspects of whole colon colonoscopy were outlined with risks including, but not limited to, perforation, bleeding and inability to visualize entire colon.  Management of each was reviewed including the risk for life saving surgery and possible admittance to the hospital.  His questions were asked and answered.  We will proceed with exam as scheduled.  Kacy Pickett [facility] and [address]    [address]    Referring Provider:  No referring provider defined for this encounter.     Primary Care Provider:  Carmelina Espinoza

## 2018-06-25 NOTE — ANESTHESIA POSTPROCEDURE EVALUATION
Patient: Kevyn Henriquez    Procedure(s):  COLONOSCOPY WITH  POLYPECTOMY - Wound Class: II-Clean Contaminated    Diagnosis:SCREENING  Diagnosis Additional Information: No value filed.    Anesthesia Type:  MAC    Note:  Anesthesia Post Evaluation    Patient location during evaluation: Bedside  Patient participation: Able to fully participate in evaluation  Level of consciousness: awake and alert  Pain management: adequate  Airway patency: patent  Cardiovascular status: acceptable  Respiratory status: acceptable  Hydration status: acceptable  PONV: none     Anesthetic complications: None          Last vitals:  Vitals:    06/25/18 1115 06/25/18 1120 06/25/18 1125   BP: 143/100 146/99 132/100   Resp:      Temp:      SpO2: 95% 95% 94%         Electronically Signed By: BHARAT Samson CRNA  June 25, 2018  1:02 PM

## 2018-06-25 NOTE — IP AVS SNAPSHOT
[facility] Preop/Phase II    [address] [address]    Phone:  [phone]                                       After Visit Summary   6/25/2018    Kevyn Henriquez    MRN: [id number]           After Visit Summary Signature Page     I have received my discharge instructions, and my questions have been answered. I have discussed any challenges I see with this plan with the nurse or doctor.    ..........................................................................................................................................  Patient/Patient Representative Signature      ..........................................................................................................................................  Patient Representative Print Name and Relationship to Patient    ..................................................               ................................................  Date                                            Time    ..........................................................................................................................................  Reviewed by Signature/Title    ...................................................              ..............................................  Date                                                            Time

## 2018-06-25 NOTE — ANESTHESIA CARE TRANSFER NOTE
Patient: Kevyn Henriquez    Procedure(s):  COLONOSCOPY WITH  POLYPECTOMY - Wound Class: II-Clean Contaminated    Diagnosis: SCREENING  Diagnosis Additional Information: No value filed.    Anesthesia Type:   MAC     Note:  Airway :Nasal Cannula  Patient transferred to:Phase II  Handoff Report: Identifed the Patient, Identified the Reponsible Provider, Reviewed the pertinent medical history, Discussed the surgical course, Reviewed Intra-OP anesthesia mangement and issues during anesthesia, Set expectations for post-procedure period and Allowed opportunity for questions and acknowledgement of understanding      Vitals: (Last set prior to Anesthesia Care Transfer)    CRNA VITALS  6/25/2018 1033 - 6/25/2018 1105      6/25/2018             Resp Rate (set): 8                Electronically Signed By: BHARAT Franklin CRNA  June 25, 2018  11:05 AM

## 2018-06-25 NOTE — OP NOTE
Kevyn Henriquez MRN# [id number]   YOB: 1950 Age: 67 year old      Date of Admission:  6/25/2018    Primary care provider: Carmelina Espinoza    PREOPERATIVE DIAGNOSIS:  Screening colonoscopy.         POSTOPERATIVE DIAGNOSIS:  Polyps in the transverse, descending, sigmoid x2, and rectum, mild colitis.          PROCEDURE:  Whole colon colonoscopy.         INDICATIONS:  This 67 year old male presents for interval screening colonoscopy.        OPERATIVE FINDINGS:  There were polyps in the transverse, descending, sigmoid x2, and rectum. There was mild colitis throughout the colon.       DESCRIPTION OF PROCEDURE:  With the patient in the supine position on the transport cart, IV sedation was administered by the nurse anesthetist.  His correct identity and planned procedure were confirmed during the requisite timeout pause and he was rolled to the left lateral position.  The anus was digitally dilated and the fiberoptic colonoscope was introduced and negotiated through the length of the colon to the cecal base.  The cecum was intubated and its landmarks clearly identified.  A circumferential examination of the mucosa on introduction of the colonoscope and on its slow withdrawal identified polyps in the transverse, descending, sigmoid and colon. The polyps were removed with cold forceps and the areas were inspected for hemostasis. There was the absence of neoplasia and/or stricture. Throughout the colon there was mild inflammation.  There were minimal diverticuli noted.  Retroflex in the rectal ampulla showed no evidence of pathology.  Air was aspirated and the colonoscope was withdrawn; the patient was returned to day surgery in good condition, without suggestion of complication and with our invitation to return in 3-5 years for followup screening examination.   The post surgical debriefing was held and acknowledged at completion.          Marino Arzola MD    6/25/2018 11:02 AM

## 2018-06-25 NOTE — OR NURSING
Eating, drinking, and tolerating well. Denies pain. Discharge instructions discussed with Pt and wife. Dressed independently, and escorted to hospitals exit via ambulatory. Ricky 20.

## 2018-06-25 NOTE — DISCHARGE INSTRUCTIONS
INSTRUCTIONS AFTER COLONOSCOPY    WHEN YOU ARE BACK HOME:    Plan to rest for an hour or two after you get home.    You may have some cramping or pressure until you pass gas.    You may resume your regular medications.    Eat a small, light meal at first, and then gradually return to normal meal sizes.  If you had a polyp removed:    Slight bleeding may occur.  You may have a slight blood stain on the toilet paper after a bowel movement.    To lessen the chance of bleeding, avoid heavy exercise for ONE WEEK.  This includes heavy lifting, vigorous sport activities, and heavy physical labor.  You may resume your normal sexual activity.      Avoid aspirin or aspirin products if instructed by your doctor.    WHAT TO WATCH FOR:  Problems rarely occur after the exam; however, it is important for you to watch for early signs of possible problems.  If you have     Unusual pain in your abdomen    Nausea and vomiting that persists    Excessive bleeding    Black or bloody bowel movements    Fever or temperature above 100.6 F  Please call your doctor ([facility] [phone]) or go to the nearest hospital emergency room.    Post-Anesthesia Patient Instructions    IMMEDIATELY FOLLOWING SURGERY:  Do not drive or operate machinery for the first twenty four hours after surgery.  Do not make any important decisions for twenty four hours after surgery or while taking narcotic pain medications or sedatives.  If you develop intractable nausea and vomiting or a severe headache please notify your doctor immediately.    FOLLOW-UP:  Please make an appointment with your surgeon as instructed. You do not need to follow up with anesthesia unless specifically instructed to do so.    WOUND CARE INSTRUCTIONS (if applicable):  Keep a dry clean dressing on the anesthesia/puncture wound site if there is drainage.  Once the wound has quit draining you may leave it open to air.  Generally you should leave the bandage intact for twenty four  hours unless there is drainage.  If the epidural site drains for more than 36-48 hours please call the anesthesia department.    QUESTIONS?:  Please feel free to call your physician or the hospital  if you have any questions, and they will be happy to assist you.

## 2018-06-25 NOTE — IP AVS SNAPSHOT
MRN:[id number]                      After Visit Summary   6/25/2018    Kevyn Henriquez    MRN: [id number]           Thank you!     Thank you for choosing [facility] for your care. Our goal is always to provide you with excellent care. Hearing back from our patients is one way we can continue to improve our services. Please take a few minutes to complete the written survey that you may receive in the mail after you visit with us. Thank you!        Patient Information     Date Of Birth          1950        About your hospital stay     You were admitted on:  June 25, 2018 You last received care in the:  [facility] Preop/Phase II    You were discharged on:  June 25, 2018       Who to Call     For medical emergencies, please call 911.  For non-urgent questions about your medical care, please call your primary care provider or clinic, [phone]  For questions related to your surgery, please call your surgery clinic        Attending Provider     Provider Specialty    Marino Arzola MD Surgery       Primary Care Provider Office Phone # Fax #    Carmelina Espinoza [phone]      After Care Instructions     Discharge Instructions       Resume pre procedure diet            Discharge Instructions       Restart home medications.                  Further instructions from your care team           INSTRUCTIONS AFTER COLONOSCOPY    WHEN YOU ARE BACK HOME:    Plan to rest for an hour or two after you get home.    You may have some cramping or pressure until you pass gas.    You may resume your regular medications.    Eat a small, light meal at first, and then gradually return to normal meal sizes.  If you had a polyp removed:    Slight bleeding may occur.  You may have a slight blood stain on the toilet paper after a bowel movement.    To lessen the chance of bleeding, avoid heavy exercise for ONE WEEK.  This includes heavy lifting, vigorous sport activities, and heavy physical labor.  You  may resume your normal sexual activity.      Avoid aspirin or aspirin products if instructed by your doctor.    WHAT TO WATCH FOR:  Problems rarely occur after the exam; however, it is important for you to watch for early signs of possible problems.  If you have     Unusual pain in your abdomen    Nausea and vomiting that persists    Excessive bleeding    Black or bloody bowel movements    Fever or temperature above 100.6 F  Please call your doctor ([facility] [phone]) or go to the nearest hospital emergency room.    Post-Anesthesia Patient Instructions    IMMEDIATELY FOLLOWING SURGERY:  Do not drive or operate machinery for the first twenty four hours after surgery.  Do not make any important decisions for twenty four hours after surgery or while taking narcotic pain medications or sedatives.  If you develop intractable nausea and vomiting or a severe headache please notify your doctor immediately.    FOLLOW-UP:  Please make an appointment with your surgeon as instructed. You do not need to follow up with anesthesia unless specifically instructed to do so.    WOUND CARE INSTRUCTIONS (if applicable):  Keep a dry clean dressing on the anesthesia/puncture wound site if there is drainage.  Once the wound has quit draining you may leave it open to air.  Generally you should leave the bandage intact for twenty four hours unless there is drainage.  If the epidural site drains for more than 36-48 hours please call the anesthesia department.    QUESTIONS?:  Please feel free to call your physician or the hospital  if you have any questions, and they will be happy to assist you.       Pending Results     No orders found from 6/23/2018 to 6/26/2018.            Admission Information     Date & Time Provider Department Dept. Phone    6/25/2018 Marino Arzola MD HI Preop/Phase [phone]      Your Vitals Were     Blood Pressure Temperature Respirations Weight Pulse Oximetry BMI (Body Mass Index)     143/100 97.3  F (36.3  C) (Oral) 18 108.9 kg (240 lb) 94% 35.44 kg/m2      [employer] Information     [employer] gives you secure access to your electronic health record. If you see a primary care provider, you can also send messages to your care team and make appointments. If you have questions, please call your primary care clinic.  If you do not have a primary care provider, please call [phone] and they will assist you.        Care EveryWhere ID     This is your Care EveryWhere ID. This could be used by other organizations to access your [facility] medical records  [id number]        Equal Access to Services     [facility] Center[facility] : Hi Correa, karlos rice, belem beatty, es tapia . So [facility] [phone].    ATENCIÓN: Si habla español, tiene a livingston disposición servicios gratuitos de asistencia lingüística. Llame al [phone].    We comply with applicable federal civil rights laws and [place] laws. We do not discriminate on the basis of race, color, national origin, age, disability, sex, sexual orientation, or gender identity.               Review of your medicines      CONTINUE these medicines which have NOT CHANGED        Dose / Directions    aspirin 81 MG tablet        Dose:  81 mg   Take 81 mg by mouth daily   Refills:  0       VITAMIN D-3 PO        Refills:  0         STOP taking     polyethylene glycol 236 g suspension   Commonly known as:  GoLYTELY/NuLYTELY                    Protect others around you: Learn how to safely use, store and throw away your medicines at www.disposemymeds.org.             Medication List: This is a list of all your medications and when to take them. Check marks below indicate your daily home schedule. Keep this list as a reference.      Medications           Morning Afternoon Evening Bedtime As Needed    aspirin 81 MG tablet   Take 81 mg by mouth daily                                VITAMIN D-3 PO

## 2018-06-25 NOTE — ANESTHESIA PREPROCEDURE EVALUATION
Anesthesia Evaluation     . Pt has had prior anesthetic.     No history of anesthetic complications          ROS/MED HX    ENT/Pulmonary:     (+)tobacco use, Current use 1/2 packs/day  , . .    Neurologic:  - neg neurologic ROS     Cardiovascular:         METS/Exercise Tolerance:  >4 METS   Hematologic:  - neg hematologic  ROS       Musculoskeletal:  - neg musculoskeletal ROS       GI/Hepatic:     (+) bowel prep,       Renal/Genitourinary:  - ROS Renal section negative       Endo:     (+) Obesity, .      Psychiatric:  - neg psychiatric ROS       Infectious Disease:  - neg infectious disease ROS       Malignancy:      - no malignancy   Other:    - neg other ROS                 Physical Exam  Normal systems: cardiovascular, pulmonary and dental    Airway   Mallampati: IV  TM distance: >3 FB  Neck ROM: full    Dental     Cardiovascular   Rhythm and rate: regular and normal      Pulmonary    breath sounds clear to auscultation                    Anesthesia Plan      History & Physical Review  History and physical reviewed and following examination; no interval change.    ASA Status:  2 .    NPO Status:  > 8 hours    Plan for MAC with Intravenous and Propofol induction. Maintenance will be TIVA.  Reason for MAC:  Deep or markedly invasive procedure (G8)  PONV prophylaxis:  Ondansetron (or other 5HT-3)  Risks and benefits of MAC anesthetic discussed including dental damage, aspiration, loss of airway, conversion to general anesthetic, CV complications, MI, stroke, death. Pt wishes to proceed.       Postoperative Care  Postoperative pain management:  IV analgesics.      Consents  Anesthetic plan, risks, benefits and alternatives discussed with:  Patient..                          .

## 2018-06-26 LAB — COPATH REPORT: NORMAL

## 2018-06-29 ENCOUNTER — TELEPHONE (OUTPATIENT)
Dept: SURGERY | Facility: OTHER | Age: 68
End: 2018-06-29

## 2018-06-29 NOTE — TELEPHONE ENCOUNTER
Patient called wanting to know if the results from his colonoscopy on 6-25-18 were back yet. Please call patient back at [phone].

## 2018-10-09 ENCOUNTER — ALLIED HEALTH/NURSE VISIT (OUTPATIENT)
Dept: FAMILY MEDICINE | Facility: OTHER | Age: 68
End: 2018-10-09
Attending: FAMILY MEDICINE
Payer: MEDICARE

## 2018-10-09 DIAGNOSIS — Z23 NEED FOR PROPHYLACTIC VACCINATION AND INOCULATION AGAINST INFLUENZA: Primary | ICD-10-CM

## 2018-10-09 PROCEDURE — G0463 HOSPITAL OUTPT CLINIC VISIT: HCPCS

## 2018-10-09 PROCEDURE — 90662 IIV NO PRSV INCREASED AG IM: CPT

## 2018-10-09 PROCEDURE — 90471 IMMUNIZATION ADMIN: CPT

## 2018-10-09 NOTE — MR AVS SNAPSHOT
After Visit Summary   10/9/2018    Kevyn Henriquez    MRN: [id number]           Patient Information     Date Of Birth          1950        Visit Information        Provider Department      10/9/2018 8:40 AM HC FLU SHOT CLINIC [facility] [place]        Today's Diagnoses     Need for prophylactic vaccination and inoculation against influenza    -  1       Follow-ups after your visit        Your next 10 appointments already scheduled     Oct 09, 2018  8:40 AM CDT   (Arrive by 8:25 AM)   Flu Shot with HC FLU SHOT CLINIC   [facility] [place] ([facility] [place] )    [address]  [place] [address]   [phone]              Who to contact     If you have questions or need follow up information about today's clinic visit or your schedule please contact [facility] directly at [phone].  Normal or non-critical lab and imaging results will be communicated to you by MyChart, letter or phone within 4 business days after the clinic has received the results. If you do not hear from us within 7 days, please contact the clinic through MyChart or phone. If you have a critical or abnormal lab result, we will notify you by phone as soon as possible.  Submit refill requests through [employer] or call your pharmacy and they will forward the refill request to us. Please allow 3 business days for your refill to be completed.          Additional Information About Your Visit        Care EveryWhere ID     This is your Care EveryWhere ID. This could be used by other organizations to access your [facility] medical records  [id number]         Blood Pressure from Last 3 Encounters:   06/25/18 132/100   05/23/18 142/90   05/17/18 (!) 165/111    Weight from Last 3 Encounters:   06/25/18 240 lb (108.9 kg)   05/23/18 256 lb (116.1 kg)   05/02/18 256 lb (116.1 kg)              We Performed the Following     HC FLU VACCINE, INCREASED ANTIGEN, PRESV FREE      Vaccine Administration, Initial [[id number]]        Primary Care Provider Office Phone # Fax #    Carmelina Espinoza [phone] [address]  [facility] [address]        Equal Access to Services     KASSY ROOT : Hadii ethel ku haddeshawno Soomaali, waaxda luqadaha, qaybta kaalmada adeegyada, waxnichole idiin chrisn adeziggy cartwright willie davidson. So [facility] [phone].    ATENCIÓN: Si habla español, tiene a livingston disposición servicios gratuitos de asistencia lingüística. Llame al [phone].    We comply with applicable federal civil rights laws and [place] laws. We do not discriminate on the basis of race, color, national origin, age, disability, sex, sexual orientation, or gender identity.            Thank you!     Thank you for choosing [facility]  for your care. Our goal is always to provide you with excellent care. Hearing back from our patients is one way we can continue to improve our services. Please take a few minutes to complete the written survey that you may receive in the mail after your visit with us. Thank you!             Your Updated Medication List - Protect others around you: Learn how to safely use, store and throw away your medicines at www.disposemymeds.org.          This list is accurate as of 10/9/18  8:32 AM.  Always use your most recent med list.                   Brand Name Dispense Instructions for use Diagnosis    aspirin 81 MG tablet      Take 81 mg by mouth daily        VITAMIN D-3 PO

## 2018-10-09 NOTE — PROGRESS NOTES

## 2019-10-18 ENCOUNTER — IMMUNIZATION (OUTPATIENT)
Dept: FAMILY MEDICINE | Facility: OTHER | Age: 69
End: 2019-10-18
Attending: FAMILY MEDICINE
Payer: COMMERCIAL

## 2019-10-18 DIAGNOSIS — Z23 NEED FOR PROPHYLACTIC VACCINATION AND INOCULATION AGAINST INFLUENZA: Primary | ICD-10-CM

## 2019-10-18 PROCEDURE — G0008 ADMIN INFLUENZA VIRUS VAC: HCPCS

## 2019-10-18 PROCEDURE — 90662 IIV NO PRSV INCREASED AG IM: CPT

## 2020-09-21 ENCOUNTER — NURSE TRIAGE (OUTPATIENT)
Dept: FAMILY MEDICINE | Facility: OTHER | Age: 70
End: 2020-09-21

## 2020-09-21 NOTE — TELEPHONE ENCOUNTER
"Protocol advises patient to be seen within 24 hours. Patient is scheduled tomorrow.    Additional Information    Negative: Severe difficulty breathing (e.g., struggling for each breath, speaks in single words)    Negative: Sounds like a life-threatening emergency to the triager    Negative: [1] Sinus infection AND [2] taking an antibiotic AND [3] symptoms continue    Negative: [1] Difficulty breathing AND [2] not from stuffy nose (e.g., not relieved by cleaning out the nose)    Negative: [1] SEVERE headache AND [2] fever    Negative: [1] Redness or swelling on the cheek, forehead or around the eye AND [2] fever    Negative: Fever > 104 F (40 C)    Negative: Patient sounds very sick or weak to the triager    Negative: [1] SEVERE pain AND [2] not improved 2 hours after pain medicine    Negative: [1] Redness or swelling on the cheek, forehead or around the eye AND [2] no fever    Negative: [1] Fever > 101 F (38.3 C) AND [2] age > 60    Negative: [1] Fever > 100.0 F (37.8 C) AND [2] bedridden (e.g., nursing home patient, CVA, chronic illness, recovering from surgery)    Negative: [1] Fever > 100.0 F (37.8 C) AND [2] diabetes mellitus or weak immune system (e.g., HIV positive, cancer chemo, splenectomy, organ transplant, chronic steroids)    Negative: Fever present > 3 days (72 hours)    Negative: [1] Fever returns after gone for over 24 hours AND [2] symptoms worse or not improved    [1] Sinus pain (not just congestion) AND [2] fever    Answer Assessment - Initial Assessment Questions  1. LOCATION: \"Where does it hurt?\"       Forehead, temples, cheek bones and behind the head and some neck pain.  2. ONSET: \"When did the sinus pain start?\"  (e.g., hours, days)       A week ago  3. SEVERITY: \"How bad is the pain?\"   (Scale 1-10; mild, moderate or severe)    - MILD (1-3): doesn't interfere with normal activities     - MODERATE (4-7): interferes with normal activities (e.g., work or school) or awakens from sleep    - SEVERE " "(8-10): excruciating pain and patient unable to do any normal activities         7/10 in AM and 2-3/10 throughout the day after pain medication. Worse when laying down.  4. RECURRENT SYMPTOM: \"Have you ever had sinus problems before?\" If so, ask: \"When was the last time?\" and \"What happened that time?\"       No  5. NASAL CONGESTION: \"Is the nose blocked?\" If so, ask, \"Can you open it or must you breathe through the mouth?\"      One side is.   6. NASAL DISCHARGE: \"Do you have discharge from your nose?\" If so ask, \"What color?\"      No  7. FEVER: \"Do you have a fever?\" If so, ask: \"What is it, how was it measured, and when did it start?\"       Yesterday temp was 99.1 after taking medication. Oral.  8. OTHER SYMPTOMS: \"Do you have any other symptoms?\" (e.g., sore throat, cough, earache, difficulty breathing)      No  9. PREGNANCY: \"Is there any chance you are pregnant?\" \"When was your last menstrual period?\"      NA    Protocols used: SINUS PAIN OR CONGESTION-A-AH      "

## 2020-09-22 ENCOUNTER — OFFICE VISIT (OUTPATIENT)
Dept: FAMILY MEDICINE | Facility: OTHER | Age: 70
End: 2020-09-22
Attending: FAMILY MEDICINE
Payer: COMMERCIAL

## 2020-09-22 VITALS
SYSTOLIC BLOOD PRESSURE: 124 MMHG | DIASTOLIC BLOOD PRESSURE: 72 MMHG | WEIGHT: 215 LBS | OXYGEN SATURATION: 95 % | BODY MASS INDEX: 32.58 KG/M2 | TEMPERATURE: 97.9 F | HEIGHT: 68 IN | HEART RATE: 50 BPM

## 2020-09-22 DIAGNOSIS — J01.00 ACUTE MAXILLARY SINUSITIS, RECURRENCE NOT SPECIFIED: Primary | ICD-10-CM

## 2020-09-22 PROCEDURE — G0463 HOSPITAL OUTPT CLINIC VISIT: HCPCS

## 2020-09-22 PROCEDURE — 99213 OFFICE O/P EST LOW 20 MIN: CPT | Performed by: FAMILY MEDICINE

## 2020-09-22 ASSESSMENT — MIFFLIN-ST. JEOR: SCORE: 1724.73

## 2020-09-22 ASSESSMENT — PAIN SCALES - GENERAL: PAINLEVEL: MILD PAIN (2)

## 2020-09-22 NOTE — PROGRESS NOTES
Subjective     Kevyn Henriquez is a 69 year old male who presents to clinic today for the following health issues:    HPI       Acute Illness  Acute illness concerns: nasal congestion  Onset/Duration: 3 days  Symptoms:  Fever: YES  Chills/Sweats: no  Headache (location?): no  Sinus Pressure: YES  Conjunctivitis:  YES  Ear Pain: no  Rhinorrhea: YES  Congestion: no  Sore Throat: YES  Cough: no  Wheeze: no  Decreased Appetite: no  Nausea: no  Vomiting: no  Diarrhea: no  Dysuria/Freq.: no  Dysuria or Hematuria: no  Fatigue/Achiness: no  Sick/Strep Exposure: no  Therapies tried and outcome: Wife's antibiotic- clindamycin   He felt some improvement.  Earlier had pressure behind mainly the right eye.  When he gets up sees a little dark spot looks like a partial [place] tree on the right side.  No significant headache.  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  Denies any shortness of breath  [facility]    Kevyn Henriquez, 69 year old, male presents with   Chief Complaint   Patient presents with     URI       PAST MEDICAL HISTORY:  Past Medical History:   Diagnosis Date     Tobacco abuse        PAST SURGICAL HISTORY:  Past Surgical History:   Procedure Laterality Date     COLONOSCOPY N/A 6/25/2018    Procedure: COLONOSCOPY;  COLONOSCOPY WITH  POLYPECTOMY;  Surgeon: Marino Arzola MD;  Location: [facility]     EXCISE GANGLION WRIST Left 4/30/2015    Procedure: EXCISE GANGLION WRIST;  Surgeon: Phyllis Tuttle MD;  Location: [facility]     EXCISE MASS NECK N/A 4/30/2015    Procedure: EXCISE MASS NECK;  Surgeon: Phyllis Tuttle MD;  Location: [facility] OR     HC EXCIS PRIMARY GANGLION WRIST      left wrist       MEDICATIONS:  Prior to Admission medications    Medication Sig Start Date End Date Taking? Authorizing Provider   amoxicillin-clavulanate (AUGMENTIN) 875-125 MG tablet Take 1 tablet by mouth 2 times daily 9/22/20  Yes RICHY Valerio MD   aspirin 81 MG tablet Take 81  "mg by mouth daily   Yes Reported, Patient       ALLERGIES:   No Known Allergies    ROS:  Constitutional, HEENT, cardiovascular, pulmonary, gi and gu systems are negative, except as otherwise noted.      EXAM:  /72   Pulse 50   Temp 97.9  F (36.6  C) (Tympanic)   Ht 1.727 m (5' 8\")   Wt 97.5 kg (215 lb)   SpO2 95%   BMI 32.69 kg/m   Body mass index is 32.69 kg/m .   GENERAL APPEARANCE: healthy, alert and no distress  EYES: Eyes grossly normal to inspection, PERRL and conjunctivae and sclerae normal  HENT: ear canals and TM's normal and nose and mouth without ulcers or lesions, has maxillary sinus tenderness  NECK: no adenopathy, no asymmetry, masses, or scars and thyroid normal to palpation  RESP: lungs clear to auscultation - no rales, rhonchi or wheezes  CV: regular rates and rhythm, normal S1 S2, no S3 or S4 and no murmur, click or rub  NEURO: Normal strength and tone, mentation intact and speech normal  PSYCH: mentation appears normal and affect normal  Lab/ X-ray  No results found for this or any previous visit (from the past 24 hour(s)).    ASSESSMENT/PLAN:    ICD-10-CM    1. Acute maxillary sinusitis, recurrence not specified  J01.00 amoxicillin-clavulanate (AUGMENTIN) 875-125 MG tablet     The sinusitis treat with Augmentin 875 twice daily 10 days.  He can use a decongestant rest push fluids I told him to stop his wife's clindamycin.  Offered we could have him see ophthalmology because of that morning vision change but he feels it is all due to the sinus infection wants to wait on that.  He will follow-up as needed    BENNY Valerio MD  September 22, 2020              "

## 2020-09-22 NOTE — NURSING NOTE
"Chief Complaint   Patient presents with     URI       Initial /72   Pulse 50   Temp 97.9  F (36.6  C) (Tympanic)   Ht 1.727 m (5' 8\")   Wt 97.5 kg (215 lb)   SpO2 95%   BMI 32.69 kg/m   Estimated body mass index is 32.69 kg/m  as calculated from the following:    Height as of this encounter: 1.727 m (5' 8\").    Weight as of this encounter: 97.5 kg (215 lb).  Medication Reconciliation: complete  Jessy Cormier LPN  "

## 2020-09-24 ENCOUNTER — TELEPHONE (OUTPATIENT)
Dept: FAMILY MEDICINE | Facility: OTHER | Age: 70
End: 2020-09-24

## 2020-09-24 NOTE — TELEPHONE ENCOUNTER
Pt's wife called, reports that pt was seen on 9/22 and diagnosed with sinus infection. Pt was prescribed augmentin. Wife concerned that pt has not improved. Wife was advised that abx can take 48-72 hours to take full effect. Advised to continue abx, call back tomorrow if no improvement. Wife verbalizes understanding.

## 2020-09-25 ENCOUNTER — TELEPHONE (OUTPATIENT)
Dept: FAMILY MEDICINE | Facility: OTHER | Age: 70
End: 2020-09-25

## 2020-09-25 NOTE — TELEPHONE ENCOUNTER
8:35 AM    Reason for Call: Phone Call    Description: pt states he saw you on 09/22 about some vision changes and it's getting worse/ please call    Was an appointment offered for this call? No  If yes : Appointment type              Date    Preferred method for responding to this message: Telephone Call  What is your phone number ? [phone]    If we cannot reach you directly, may we leave a detailed response at the number you provided? Yes    Can this message wait until your PCP/provider returns, if available today? YES, No, provider is in    Consuelo Eldridge

## 2020-09-29 ENCOUNTER — NURSE TRIAGE (OUTPATIENT)
Dept: FAMILY MEDICINE | Facility: OTHER | Age: 70
End: 2020-09-29

## 2020-09-29 DIAGNOSIS — G44.209 TENSION HEADACHE: ICD-10-CM

## 2020-09-29 DIAGNOSIS — M54.2 NECK PAIN: Primary | ICD-10-CM

## 2020-09-29 NOTE — TELEPHONE ENCOUNTER
Patient called stating he has been speaking with Dr. Crawford about vision changes since taking antibiotic prescribed for sinus infection.  Patient states both eyes have a black floater. Patient believes this is a side effect of medication prescribed. Patient has spoke with Dr. Crawford and has seen ophthalmologist. Patient states he has been off of antibiotic for 48 hours and is still experiencing vision changes. Patient denies slurred speech or one sided weakness. Patient states he wakes up every morning with a slight headache that goes away with tylenol. Patient also states he was experiencing jaw pain about one week ago but currently is not experiencing any pain. Patient advised to be seen in ED. Patient declined asking for message to be sent to Dr. Crawford. Contact information verified.     Nurse advised patient to be seen in ED if symptoms worsened.    Additional Information    Negative: Weakness of the face, arm or leg on one side of the body    Negative: Followed getting substance in the eye    Negative: Foreign body or object is or was lodged in the eye    Negative: Followed an eye injury    Negative: Followed sun lamp or sun exposure (UV keratitis)    Negative: Yellow or green discharge (pus) in the eye    Negative: Pregnant    Negative: Complete loss of vision in 1 or both eyes    Negative: Severe eye pain    Negative: Severe headache    Negative: Double vision    Negative: Blurred vision or visual changes and present now and sudden onset or new (e.g., minutes, hours, days) (Exception: seeing floaters / black specks OR previously diagnosed migraine headaches with same symptoms)    Negative: Patient sounds very sick or weak to the triager    Negative: Flashes of light  (Exception: brief from pressing on the eyeball)    Negative: Eye pain and brief (now gone) blurred vision or visual changes    Negative: Taking digoxin (e.g., Lanoxin, Digitek, Cardoxin, Lanoxicaps; Toloxin in [place]) and blurred vision, yellow vision, or  "yellow-green halos    Negative: Many floaters in the eye    Jaw pain while eating and age > 50    Headache and age > 50    Answer Assessment - Initial Assessment Questions  1. DESCRIPTION: \"What is the vision loss like? Describe it for me.\" (e.g., complete vision loss, blurred vision, double vision, floaters, etc.)      Floaters in both eyes.  Black floaters in both eyes  2. LOCATION: \"One or both eyes?\" If one, ask: \"Which eye?\"      Both eyes  3. SEVERITY: \"Can you see anything?\" If so, ask: \"What can you see?\" (e.g., fine print)      Patient unable to read paper  4. ONSET: \"When did this begin?\" \"Did it start suddenly or has this been gradual?\"      A little over a week ago  5. PATTERN: \"Does this come and go, or has it been constant since it started?\"      constant  6. PAIN: \"Is there any pain in your eye(s)?\"  (Scale 1-10; or mild, moderate, severe)      no  7. CONTACTS-GLASSES: \"Do you wear contacts or glasses?\"      glasses  8. CAUSE: \"What do you think is causing this visual problem?\"      Patient started antibiotic about 3 days prior to when vision problems occurred. Patient state he has since stopped antibiotics and seen an ophthalmologist patient states black floaters are still present.  9. OTHER SYMPTOMS: \"Do you have any other symptoms?\" (e.g., confusion, headache, arm or leg weakness, speech problems)      Headache when patient wakes up in the morning. Headache is at the base of the skull near neck.  10. PREGNANCY: \"Is there any chance you are pregnant?\" \"When was your last menstrual period?\"        n/a    Protocols used: VISION LOSS OR CHANGE-A-OH      "

## 2020-10-07 ENCOUNTER — APPOINTMENT (OUTPATIENT)
Dept: CT IMAGING | Facility: HOSPITAL | Age: 70
End: 2020-10-07
Attending: EMERGENCY MEDICINE
Payer: COMMERCIAL

## 2020-10-07 ENCOUNTER — HOSPITAL ENCOUNTER (OUTPATIENT)
Dept: MRI IMAGING | Facility: HOSPITAL | Age: 70
End: 2020-10-07
Attending: FAMILY MEDICINE
Payer: COMMERCIAL

## 2020-10-07 ENCOUNTER — HOSPITAL ENCOUNTER (EMERGENCY)
Facility: HOSPITAL | Age: 70
Discharge: HOME | End: 2020-10-07
Attending: EMERGENCY MEDICINE | Admitting: EMERGENCY MEDICINE
Payer: COMMERCIAL

## 2020-10-07 VITALS
HEART RATE: 90 BPM | OXYGEN SATURATION: 95 % | RESPIRATION RATE: 18 BRPM | TEMPERATURE: 98.1 F | SYSTOLIC BLOOD PRESSURE: 169 MMHG | DIASTOLIC BLOOD PRESSURE: 123 MMHG

## 2020-10-07 DIAGNOSIS — G44.209 TENSION HEADACHE: ICD-10-CM

## 2020-10-07 DIAGNOSIS — Z72.0 TOBACCO ABUSE: ICD-10-CM

## 2020-10-07 DIAGNOSIS — M54.2 NECK PAIN: ICD-10-CM

## 2020-10-07 DIAGNOSIS — I63.9 CEREBELLAR INFARCT (H): Primary | ICD-10-CM

## 2020-10-07 LAB
ALBUMIN SERPL-MCNC: 3.6 G/DL (ref 3.4–5)
ALP SERPL-CCNC: 71 U/L (ref 40–150)
ALT SERPL W P-5'-P-CCNC: 23 U/L (ref 0–70)
ANION GAP SERPL CALCULATED.3IONS-SCNC: 2 MMOL/L (ref 3–14)
APTT PPP: 33 SEC (ref 22–37)
AST SERPL W P-5'-P-CCNC: 14 U/L (ref 0–45)
BASOPHILS # BLD AUTO: 0.1 10E9/L (ref 0–0.2)
BASOPHILS NFR BLD AUTO: 0.5 %
BILIRUB SERPL-MCNC: 0.3 MG/DL (ref 0.2–1.3)
BUN SERPL-MCNC: 15 MG/DL (ref 7–30)
CALCIUM SERPL-MCNC: 8.8 MG/DL (ref 8.5–10.1)
CHLORIDE SERPL-SCNC: 107 MMOL/L (ref 94–109)
CHOLEST SERPL-MCNC: 165 MG/DL
CO2 SERPL-SCNC: 30 MMOL/L (ref 20–32)
CREAT SERPL-MCNC: 0.76 MG/DL (ref 0.66–1.25)
DIFFERENTIAL METHOD BLD: NORMAL
EOSINOPHIL # BLD AUTO: 0.2 10E9/L (ref 0–0.7)
EOSINOPHIL NFR BLD AUTO: 2.3 %
ERYTHROCYTE [DISTWIDTH] IN BLOOD BY AUTOMATED COUNT: 12.2 % (ref 10–15)
GFR SERPL CREATININE-BSD FRML MDRD: >90 ML/MIN/{1.73_M2}
GLUCOSE SERPL-MCNC: 98 MG/DL (ref 70–99)
HCT VFR BLD AUTO: 48.2 % (ref 40–53)
HDLC SERPL-MCNC: 35 MG/DL
HGB BLD-MCNC: 16.2 G/DL (ref 13.3–17.7)
IMM GRANULOCYTES # BLD: 0 10E9/L (ref 0–0.4)
IMM GRANULOCYTES NFR BLD: 0.4 %
INR PPP: 1.11 (ref 0.86–1.14)
LDLC SERPL CALC-MCNC: 109 MG/DL
LYMPHOCYTES # BLD AUTO: 2.7 10E9/L (ref 0.8–5.3)
LYMPHOCYTES NFR BLD AUTO: 26.3 %
MCH RBC QN AUTO: 30.8 PG (ref 26.5–33)
MCHC RBC AUTO-ENTMCNC: 33.6 G/DL (ref 31.5–36.5)
MCV RBC AUTO: 92 FL (ref 78–100)
MONOCYTES # BLD AUTO: 0.8 10E9/L (ref 0–1.3)
MONOCYTES NFR BLD AUTO: 7.8 %
NEUTROPHILS # BLD AUTO: 6.5 10E9/L (ref 1.6–8.3)
NEUTROPHILS NFR BLD AUTO: 62.7 %
NONHDLC SERPL-MCNC: 130 MG/DL
NRBC # BLD AUTO: 0 10*3/UL
NRBC BLD AUTO-RTO: 0 /100
PLATELET # BLD AUTO: 275 10E9/L (ref 150–450)
POTASSIUM SERPL-SCNC: 4.2 MMOL/L (ref 3.4–5.3)
PROT SERPL-MCNC: 8 G/DL (ref 6.8–8.8)
RBC # BLD AUTO: 5.26 10E12/L (ref 4.4–5.9)
SODIUM SERPL-SCNC: 139 MMOL/L (ref 133–144)
TRIGL SERPL-MCNC: 103 MG/DL
TROPONIN I SERPL-MCNC: <0.015 UG/L (ref 0–0.04)
WBC # BLD AUTO: 10.3 10E9/L (ref 4–11)

## 2020-10-07 PROCEDURE — 85610 PROTHROMBIN TIME: CPT | Performed by: EMERGENCY MEDICINE

## 2020-10-07 PROCEDURE — 80053 COMPREHEN METABOLIC PANEL: CPT | Performed by: EMERGENCY MEDICINE

## 2020-10-07 PROCEDURE — 70553 MRI BRAIN STEM W/O & W/DYE: CPT

## 2020-10-07 PROCEDURE — 84484 ASSAY OF TROPONIN QUANT: CPT | Performed by: EMERGENCY MEDICINE

## 2020-10-07 PROCEDURE — 85025 COMPLETE CBC W/AUTO DIFF WBC: CPT | Performed by: EMERGENCY MEDICINE

## 2020-10-07 PROCEDURE — 93005 ELECTROCARDIOGRAM TRACING: CPT

## 2020-10-07 PROCEDURE — 70496 CT ANGIOGRAPHY HEAD: CPT

## 2020-10-07 PROCEDURE — 85730 THROMBOPLASTIN TIME PARTIAL: CPT | Performed by: EMERGENCY MEDICINE

## 2020-10-07 PROCEDURE — 250N000011 HC RX IP 250 OP 636: Performed by: EMERGENCY MEDICINE

## 2020-10-07 PROCEDURE — A9585 GADOBUTROL INJECTION: HCPCS | Performed by: RADIOLOGY

## 2020-10-07 PROCEDURE — 99285 EMERGENCY DEPT VISIT HI MDM: CPT | Performed by: EMERGENCY MEDICINE

## 2020-10-07 PROCEDURE — 36415 COLL VENOUS BLD VENIPUNCTURE: CPT

## 2020-10-07 PROCEDURE — 99285 EMERGENCY DEPT VISIT HI MDM: CPT | Mod: 25

## 2020-10-07 PROCEDURE — 93010 ELECTROCARDIOGRAM REPORT: CPT | Performed by: INTERNAL MEDICINE

## 2020-10-07 PROCEDURE — 72141 MRI NECK SPINE W/O DYE: CPT

## 2020-10-07 PROCEDURE — 255N000002 HC RX 255 OP 636: Performed by: RADIOLOGY

## 2020-10-07 PROCEDURE — 80061 LIPID PANEL: CPT | Performed by: EMERGENCY MEDICINE

## 2020-10-07 RX ORDER — IOPAMIDOL 755 MG/ML
50 INJECTION, SOLUTION INTRAVASCULAR ONCE
Status: COMPLETED | OUTPATIENT
Start: 2020-10-07 | End: 2020-10-07

## 2020-10-07 RX ORDER — GADOBUTROL 604.72 MG/ML
10 INJECTION INTRAVENOUS ONCE
Status: COMPLETED | OUTPATIENT
Start: 2020-10-07 | End: 2020-10-07

## 2020-10-07 RX ADMIN — GADOBUTROL 10 ML: 604.72 INJECTION INTRAVENOUS at 16:43

## 2020-10-07 RX ADMIN — IOPAMIDOL 50 ML: 755 INJECTION, SOLUTION INTRAVENOUS at 18:59

## 2020-10-07 ASSESSMENT — ENCOUNTER SYMPTOMS
ARTHRALGIAS: 0
DIFFICULTY URINATING: 0
EYE REDNESS: 0
HEADACHES: 0
SHORTNESS OF BREATH: 0
FEVER: 0
ABDOMINAL PAIN: 0
CONFUSION: 0
COLOR CHANGE: 0
NECK STIFFNESS: 0

## 2020-10-07 NOTE — ED PROVIDER NOTES
"  History     Chief Complaint   Patient presents with     Told to come to ER after MRI done today.     HPI  Kevyn Henriquez is a 69 year old male who presented to the emergency department after being referred here from the MRI room patient presented to the MRI and I got a call from the radiologist that patient had right acute cerebellar tonsillar infarct.  Patient reports that 3 weeks ago he started experiencing blurring of vision with floaters in his eyes.  He was reviewed by an optometrist who gave him \"a clean bill of health of my eyes\".  He was also reviewed by his PCP prescribed Augmentin antibiotics but his blurring of vision did not improve.  He thought he could be having a sinus infection because he had some headache that was especially at the front of the head but also neck pain.  Took some Tylenol that will help with the pain.  He denies hypertension or diabetes but is a smoker at least half a pack per day for more than 50 years.  He has not seen a medical provider in several years and does not have any recent laboratory testing done.  He denies slurring of speech, unilateral body weakness, difficulty swallowing, dizziness or feeling lightheaded.  No history of A. fib or heart disease.  No prior heart attacks or strokes.    Allergies:  No Known Allergies    Problem List:    Patient Active Problem List    Diagnosis Date Noted     Cerebellar infarct (H) 10/07/2020     Priority: Medium     Tobacco abuse      Priority: Medium        Past Medical History:    Past Medical History:   Diagnosis Date     Tobacco abuse        Past Surgical History:    Past Surgical History:   Procedure Laterality Date     COLONOSCOPY N/A 6/25/2018    Procedure: COLONOSCOPY;  COLONOSCOPY WITH  POLYPECTOMY;  Surgeon: Marino Arzola MD;  Location: [facility]     EXCISE GANGLION WRIST Left 4/30/2015    Procedure: EXCISE GANGLION WRIST;  Surgeon: Phyllis Tuttle MD;  Location: [facility]     EXCISE MASS NECK N/A 4/30/2015    Procedure: " EXCISE MASS NECK;  Surgeon: Phyllis Tuttle MD;  Location: [facility]     HC EXCIS PRIMARY GANGLION WRIST      left wrist       Family History:    Family History   Problem Relation Age of Onset     Diabetes Mother         borderline diabetes     Heart Disease Father      Hypertension Father      Hypertension Brother      Hypertension Brother        Social History:  Marital Status:   [2]  Social History     Tobacco Use     Smoking status: Current Every Day Smoker     Packs/day: 0.50     Types: Cigarettes     Smokeless tobacco: Never Used   Substance Use Topics     Alcohol use: No     Drug use: No        Medications:         aspirin 81 MG tablet       UNABLE TO FIND       atorvastatin (LIPITOR) 40 MG tablet          Review of Systems   Constitutional: Negative for fever.   HENT: Negative for congestion.    Eyes: Negative for redness.   Respiratory: Negative for shortness of breath.    Cardiovascular: Negative for chest pain.   Gastrointestinal: Negative for abdominal pain.   Genitourinary: Negative for difficulty urinating.   Musculoskeletal: Negative for arthralgias and neck stiffness.   Skin: Negative for color change.   Neurological: Negative for headaches.   Psychiatric/Behavioral: Negative for confusion.   All other systems reviewed and are negative.      Physical Exam   BP: (!) 173/122  Pulse: 88  Temp: 98.1  F (36.7  C)  Resp: 16  SpO2: 97 %      Physical Exam  Vitals signs and nursing note reviewed.   Constitutional:       General: He is not in acute distress.     Appearance: He is well-developed. He is not diaphoretic.   HENT:      Head: Normocephalic and atraumatic.   Eyes:      Pupils: Pupils are equal, round, and reactive to light.   Cardiovascular:      Rate and Rhythm: Normal rate and regular rhythm.      Heart sounds: Normal heart sounds.   Pulmonary:      Effort: Pulmonary effort is normal. No respiratory distress.      Breath sounds: Normal breath sounds. No stridor.   Abdominal:      General:  Bowel sounds are normal. There is no distension.      Tenderness: There is no abdominal tenderness.   Musculoskeletal:         General: No tenderness or deformity.   Neurological:      Mental Status: He is alert and oriented to person, place, and time.      Cranial Nerves: No cranial nerve deficit.         ED Course   Patient evaluated upon arrival to the ED and CTA head and neck ordered including labs.    Procedures       EKG Interpretation:      Interpreted by Kaushal Lees MD  Time reviewed: 6:10 PM  Symptoms at time of EKG: Right cerebellar tonsillar infarct  Rhythm: normal sinus   Rate: normal  Axis: normal  Ectopy: none  Conduction: normal  ST Segments/ T Waves: No ST-T wave changes  Q Waves: none  Comparison to prior: No old EKG available    Clinical Impression: Sinus rhythm with frequent PVCs, bifascicular block        Results for orders placed or performed during the hospital encounter of 10/07/20 (from the past 24 hour(s))   CBC with platelets differential   Result Value Ref Range    WBC 10.3 4.0 - 11.0 10e9/L    RBC Count 5.26 4.4 - 5.9 10e12/L    Hemoglobin 16.2 13.3 - 17.7 g/dL    Hematocrit 48.2 40.0 - 53.0 %    MCV 92 78 - 100 fl    MCH 30.8 26.5 - 33.0 pg    MCHC 33.6 31.5 - 36.5 g/dL    RDW 12.2 10.0 - 15.0 %    Platelet Count 275 150 - 450 10e9/L    Diff Method Automated Method     % Neutrophils 62.7 %    % Lymphocytes 26.3 %    % Monocytes 7.8 %    % Eosinophils 2.3 %    % Basophils 0.5 %    % Immature Granulocytes 0.4 %    Nucleated RBCs 0 0 /100    Absolute Neutrophil 6.5 1.6 - 8.3 10e9/L    Absolute Lymphocytes 2.7 0.8 - 5.3 10e9/L    Absolute Monocytes 0.8 0.0 - 1.3 10e9/L    Absolute Eosinophils 0.2 0.0 - 0.7 10e9/L    Absolute Basophils 0.1 0.0 - 0.2 10e9/L    Abs Immature Granulocytes 0.0 0 - 0.4 10e9/L    Absolute Nucleated RBC 0.0    INR   Result Value Ref Range    INR 1.11 0.86 - 1.14   Partial thromboplastin time   Result Value Ref Range    PTT 33 22 - 37 sec   Troponin I   Result  Value Ref Range    Troponin I ES <0.015 0.000 - 0.045 ug/L   Comprehensive metabolic panel   Result Value Ref Range    Sodium 139 133 - 144 mmol/L    Potassium 4.2 3.4 - 5.3 mmol/L    Chloride 107 94 - 109 mmol/L    Carbon Dioxide 30 20 - 32 mmol/L    Anion Gap 2 (L) 3 - 14 mmol/L    Glucose 98 70 - 99 mg/dL    Urea Nitrogen 15 7 - 30 mg/dL    Creatinine 0.76 0.66 - 1.25 mg/dL    GFR Estimate >90 >60 mL/min/[1.73_m2]    GFR Estimate If Black >90 >60 mL/min/[1.73_m2]    Calcium 8.8 8.5 - 10.1 mg/dL    Bilirubin Total 0.3 0.2 - 1.3 mg/dL    Albumin 3.6 3.4 - 5.0 g/dL    Protein Total 8.0 6.8 - 8.8 g/dL    Alkaline Phosphatase 71 40 - 150 U/L    ALT 23 0 - 70 U/L    AST 14 0 - 45 U/L   Lipid Profile   Result Value Ref Range    Cholesterol 165 <200 mg/dL    Triglycerides 103 <150 mg/dL    HDL Cholesterol 35 (L) >39 mg/dL    LDL Cholesterol Calculated 109 (H) <100 mg/dL    Non HDL Cholesterol 130 (H) <130 mg/dL   CTA Head Neck with Contrast    Narrative    PROCEDURE: CTA  HEAD NECK WITH CONTRAST 10/7/2020 8:24 PM    HISTORY: Neuro deficit(s), subacute; Evaluate for  dissection/thromboembolism    COMPARISONS: None.    Meds/Dose Given: isovue 370; 50 ml    TECHNIQUE: CT angiogram of the neck and CT angiogram of the brain with  sagittal coronal mip reconstructions    FINDINGS: CT angiogram of the neck: Brachiocephalic artery is widely  patent. The right subclavian and right vertebral artery is normal. The  right common carotid right carotid bifurcation and right internal  carotid artery is normal. The left common carotid left carotid  bifurcation and left internal carotid artery is normal. The left  subclavian and left vertebral arteries are normal.    CT angiogram of the brain: The distal vertebral basilar and superior  cerebellar and posterior cerebral arteries appear normal. Carotid  siphon supraclinoid internal carotid anterior and middle cerebral  arteries appear normal. No intracranial stenoses or aneurysms  or  vascular occlusions are noted.    There is a polyp or retention cyst seen in the right maxillary sinus.  Salivary glands appear normal. The muscles of mastication and  parapharyngeal spaces are normal. The larynx and upper trachea is  normal. There is a large mass in the left lobe of the thyroid  deviating the trachea to the right. The cervical and upper mediastinal  lymph nodes are normal.         Impression    IMPRESSION: No stenoses occlusions or aneurysms are seen in the  vessels of the neck and brain    MARIA GUADALUPE LOPEZ MD       Medications   sodium chloride (PF) 0.9% PF flush 100 mL (100 mLs Intravenous Given 10/7/20 1859)   iopamidol (ISOVUE-370) solution 50 mL (50 mLs Intravenous Given 10/7/20 1859)       Assessments & Plan (with Medical Decision Making)   Cerebellar infarct: Patient was transferred to the emergency department after MRI done as outpatient revealed right cerebellar tonsillar acute infarct.  Labs done showed normal CBC, CMP, normal total cholesterol, normal INR.  EKG showed normal sinus rhythm with frequent PVCs.  NIH stroke scale score was 0.  Patient's other risk factors include obesity,  tobacco abuse, uncontrolled hypertension, age and gender.  Patient care was transferred to oncoming provider Dr. Renee at 8 PM pending CTA head and neck results.  He will discussed with the stroke neurologist on-call once the CTA results are available and subsequent patient disposition.    I have reviewed the nursing notes.    I have reviewed the findings, diagnosis, plan and need for follow up with the patient.     National Institutes of Health Stroke Scale  Exam Interval: Baseline   Score    Level of consciousness: (0)   Alert, keenly responsive    LOC questions: (0)   Answers both questions correctly    LOC commands: (0)   Performs both tasks correctly    Best gaze: (0)   Normal    Visual: (0)   No visual loss    Facial palsy: (0)   Normal symmetrical movements    Motor arm (left): (0)   No drift     Motor arm (right): (0)   No drift    Motor leg (left): (0)   No drift    Motor leg (right): (0)   No drift    Limb ataxia: (0)   Absent    Sensory: (0)   Normal- no sensory loss    Best language: (0)   Normal- no aphasia    Dysarthria: (0)   Normal    Extinction and inattention: (0)   No abnormality        Total Score:  0       Discharge Medication List as of 10/7/2020  9:19 PM          Final diagnoses:   Cerebellar infarct (H)       10/7/2020   [facility]     Kaushal Lees MD  10/08/20 [id number]

## 2020-10-07 NOTE — ED AVS SNAPSHOT
[facility]  [address]  [place] [address]  Phone: [phone]                                    Kevyn Henriquez   MRN: [id number]    Department: [facility]   Date of Visit: 10/7/2020           After Visit Summary Signature Page    I have received my discharge instructions, and my questions have been answered. I have discussed any challenges I see with this plan with the nurse or doctor.    ..........................................................................................................................................  Patient/Patient Representative Signature      ..........................................................................................................................................  Patient Representative Print Name and Relationship to Patient    ..................................................               ................................................  Date                                   Time    ..........................................................................................................................................  Reviewed by Signature/Title    ...................................................              ..............................................  Date                                               Time          22EPIC Rev 08/18

## 2020-10-07 NOTE — ED TRIAGE NOTES
Pt states he was sent over from MRI and was not told why. BEFAST negative. Cervical MRI resilts in the computer, still waiting for MRI of head results.

## 2020-10-07 NOTE — ED NOTES
Pressure in back of head, facial, eyes and jaw. Had been diagnosed with a sinus infection. Gait is steady, denies weakness, equal strength in hands and legs, denies any issues with speech or confusion.

## 2020-10-08 ENCOUNTER — TELEPHONE (OUTPATIENT)
Dept: FAMILY MEDICINE | Facility: OTHER | Age: 70
End: 2020-10-08

## 2020-10-08 ENCOUNTER — OFFICE VISIT (OUTPATIENT)
Dept: FAMILY MEDICINE | Facility: OTHER | Age: 70
End: 2020-10-08
Attending: FAMILY MEDICINE
Payer: COMMERCIAL

## 2020-10-08 VITALS
HEART RATE: 84 BPM | BODY MASS INDEX: 33.65 KG/M2 | SYSTOLIC BLOOD PRESSURE: 138 MMHG | TEMPERATURE: 98.1 F | HEIGHT: 68 IN | DIASTOLIC BLOOD PRESSURE: 72 MMHG | WEIGHT: 222 LBS | OXYGEN SATURATION: 95 %

## 2020-10-08 DIAGNOSIS — F17.200 TOBACCO USE DISORDER: ICD-10-CM

## 2020-10-08 DIAGNOSIS — Z23 NEED FOR VACCINATION: ICD-10-CM

## 2020-10-08 DIAGNOSIS — M50.30 DDD (DEGENERATIVE DISC DISEASE), CERVICAL: ICD-10-CM

## 2020-10-08 DIAGNOSIS — E07.9 THYROID MASS: ICD-10-CM

## 2020-10-08 DIAGNOSIS — Z71.6 ENCOUNTER FOR TOBACCO USE CESSATION COUNSELING: ICD-10-CM

## 2020-10-08 DIAGNOSIS — I63.9 CEREBELLAR INFARCT (H): Primary | ICD-10-CM

## 2020-10-08 DIAGNOSIS — D33.3 ACOUSTIC NEUROMA (H): ICD-10-CM

## 2020-10-08 LAB — TSH SERPL DL<=0.005 MIU/L-ACNC: 1.04 MU/L (ref 0.4–4)

## 2020-10-08 PROCEDURE — G0463 HOSPITAL OUTPT CLINIC VISIT: HCPCS | Mod: 25

## 2020-10-08 PROCEDURE — G0008 ADMIN INFLUENZA VIRUS VAC: HCPCS

## 2020-10-08 PROCEDURE — 84443 ASSAY THYROID STIM HORMONE: CPT | Mod: ZL | Performed by: FAMILY MEDICINE

## 2020-10-08 PROCEDURE — 99215 OFFICE O/P EST HI 40 MIN: CPT | Performed by: FAMILY MEDICINE

## 2020-10-08 RX ORDER — ATORVASTATIN CALCIUM 40 MG/1
40 TABLET, FILM COATED ORAL DAILY
Qty: 90 TABLET | Refills: 3 | Status: SHIPPED | OUTPATIENT
Start: 2020-10-08 | End: 2021-09-30

## 2020-10-08 ASSESSMENT — PAIN SCALES - GENERAL: PAINLEVEL: NO PAIN (0)

## 2020-10-08 ASSESSMENT — MIFFLIN-ST. JEOR: SCORE: 1756.49

## 2020-10-08 NOTE — ED NOTES
Patient given verbal and written discharge instructions. Patient verbalized understanding of discharge instructions. To follow up with primary care provider regarding blood pressure medications if needed and starting a statin. BP elevated on both arms. Updated Dr. Renee on elevated BP on bilateral arms. No new orders. Follow up with PCP. Message sent to Case Management RN.

## 2020-10-08 NOTE — NURSING NOTE
"Chief Complaint   Patient presents with     CT Results     follow up       Initial /72 (BP Location: Right arm, Patient Position: Sitting, Cuff Size: Adult Regular)   Pulse 84   Temp 98.1  F (36.7  C) (Tympanic)   Ht 1.727 m (5' 8\")   Wt 100.7 kg (222 lb)   SpO2 95%   BMI 33.75 kg/m   Estimated body mass index is 33.75 kg/m  as calculated from the following:    Height as of this encounter: 1.727 m (5' 8\").    Weight as of this encounter: 100.7 kg (222 lb).  Medication Reconciliation: complete  Soco Luna LPN  "

## 2020-10-08 NOTE — PROGRESS NOTES
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  Seen in clinic, treated with Augmentin by partner for sinus infection 9/22/2020.  Reported vision changes as well.    Saw Dr Tian in [place] 1-2 weeks ago - some macular degeneration, and suggested eye vitamins.  Stopped multivitamin.  Eyes otherwise fine. No retinal issue, hemorrhage, etc.  Floaters fade away after eyes opened.  Bilateral.  Will recheck in a month.    Then MRI ordered due to neck pain and vision changes - brain and C spine MRI ordered.  Done yesterday.  Multiple abnormal findings - acute cerebellar stroke, acoustic neuroma, cervical disc bulge, and thyroid mass.  CTA head and neck negative.    Got call from radiology with results.  Then sent to ER because of stroke findings.  No other focal neurologic findings.  BP was elevated in /122.  Ok today.  ER consulted neurology via phone - discharged on full dose 325 mg ASA, low dose statin per lipid profile.    No issues with balance.  No recent falls.  Walks dogs 2 1/2 miles regularly without difficulty.    Quit smoking yesterday.  Prior 1/2 ppd or more.  Declines quit assistance.    Does report a fall, striking head on ground quite hard - years ago.  Patient wonders if related.  However, stroke was read as acute.      Labs done yesterday.   Notable for LDL  ">100.  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  Does have right hearing loss.  Noise exposure over years.  Not recent.    Left thyroid mass.  Denies feeling mass, trouble swallowing, or other changes.  Mass is large 5 cm, deviating trachea to the right.          Review of Systems   Constitutional, HEENT, cardiovascular, pulmonary, GI, , musculoskeletal, neuro, skin, endocrine and psych systems are negative, except as otherwise noted.      Objective    /72 (BP Location: Right arm, Patient Position: Sitting, Cuff Size: Adult Regular)   Pulse 84   Temp 98.1  F (36.7  C) (Tympanic)   Ht 1.727 m (5' 8\")   Wt 100.7 kg (222 lb)   SpO2 95%   BMI 33.75 kg/m    Body mass index is 33.75 kg/m .  Physical Exam   GENERAL: healthy, alert and no distress  EYES: Eyes grossly normal to inspection, PERRL and conjunctivae and sclerae normal  HENT: ear canals and TM's normal, nose and mouth without ulcers or lesions  NECK: no adenopathy and no lymphadenopathy palpated; suprisingly could not see or feel the large thyroid mass on palpation today; did follow trachea down, and felt as though it was slightly deviated to the right  RESP: lungs clear to auscultation - no rales, rhonchi or wheezes  CV: regular rate and rhythm, normal S1 S2, no S3 or S4, no murmur, click or rub, no peripheral edema and peripheral pulses strong  MS: no gross musculoskeletal defects noted, no edema  NEURO: Normal strength and tone, sensory exam grossly normal, mentation intact, speech normal, cranial nerves 2-12 intact, DTR's normal and symmetric, gait normal including heel/toe/tandem walking, Romberg " normal and rapid alternating movements normal; finger nose testing normal; negative pronator drift  PSYCH: mentation appears normal, affect normal/bright    ER and clinic notes, labs, imaging reports reviewed with patient.        Assessment & Plan     Cerebellar infarct (H)  Found incidentally?  Asymtomatic?  Cerebellar, but no balance/coordination deficits.  Ongoing vision changes - floaters.  Negative eye exam with ophthalmology.  Imaging as above.  Risk factor modification.  Quit smoking yesterday.  BP normal today.  Normal glucose.  Starting statin today.  Referral to neurology for additional evaluation/recommendations.  - NEUROLOGY ADULT REFERRAL  - atorvastatin (LIPITOR) 40 MG tablet; Take 1 tablet (40 mg) by mouth daily    Thyroid mass  Called Dr Orona to discus.  Will check TSH reflex T4 today and set up for ultrasound.  Then can do FNA as next step.  She will consult as well.  - OTOLARYNGOLOGY REFERRAL  - US Thyroid; Future  - TSH with free T4 reflex    Acoustic neuroma (H)  Discussed with ENT Dr Orona as well.    ENT referral and audiogram referral at her request.  Hold off on MRI IAC at this time.  She will review current imaging and decide from there.  - OTOLARYNGOLOGY REFERRAL  - AUDIOLOGY ADULT REFERRAL; Future    DDD (degenerative disc disease), cervical  No radicular symptoms.  No acute care needed at this time.    Tobacco use disorder  Stopped smoking yesterday.  Feels he can do this cold turkey on his own.    Encounter for tobacco use cessation counseling  As above.    Need for vaccination  Flu shot given.  - FLU VACCINE, INCREASED ANTIGEN, PRESV FREE  - ADMIN 1st VACCINE     Tobacco Cessation:   reports that he has been smoking cigarettes. He has been smoking about 0.50 packs per day. He has never used smokeless tobacco.  Tobacco Cessation Action Plan: Information offered: Patient not interested at this time  Quitting on own.      BMI:   Estimated body mass index is 33.75 kg/m  as  "calculated from the following:    Height as of this encounter: 1.727 m (5' 8\").    Weight as of this encounter: 100.7 kg (222 lb).           Over an hour spent with patient (and wife), with over 50% in direct counseling and coordination of care.    Patient Instructions   Lab today - thyroid TSH.  Thyroid ultrasound ordered.  Next step would be biopsy.    Audiogram ordered.    ENT referral placed - Dr Orona.    Neurology referral placed - Vanessa.    Add Lipitor 40 mg daily.  Continue Aspirin 325 mg daily.  Refrain from restarting smoking.          No follow-ups on file.    Carmelina Pappas MD  [facility] [place]    "

## 2020-10-08 NOTE — DISCHARGE INSTRUCTIONS
What to expect when you have contrast    During your exam, we will inject  contrast  into your vein or artery. (Contrast is a clear liquid with iodine in it. It shows up on X-rays.)    You may feel warm or hot. You may have a metal taste in your mouth and a slight upset stomach. You may also feel pressure near the kidneys and bladder. These effects will last about 1 to 3 minutes.    Please tell us if you have:   Sneezing    Itching   Hives    Swelling in the face   A hoarse voice   Breathing problems   Other new symptoms    Serious problems are rare.  They may include:   Irregular heartbeat    Seizures   Kidney failure             Tissue damage   Shock     Death    If you have any problems during the exam, we  will treat them right away.    When you get home    Call your hospital if you have any new symptoms in the next 2 days, like hives or swelling. (Phone numbers are at the bottom of this page.) Or call your family doctor.     If you have wheezing or trouble breathing, call 911.    Self-care  -Drink at least 4 extra glasses of water today.   This reduces the stress on your kidneys.  -Keep taking your regular medicines.    The contrast will pass out of your body in your  Urine(pee). This will happen in the next 24 hours. You  will not feel this. Your urine will not  change color.    If you have kidney problems or take metformin    Drink 4 to 8 large glasses of water for the next  2 days, if you are not on a fluid restriction.    ?If you take metformin (Glucophage or Glucovance) for diabetes, keep taking it.      ?Your kidney function tests are abnormal.  If you take Metformin, do not take it for 48 hours. Please go to your clinic for a blood test within 3 days after your exam before the restarting this medicine.     (Note to provider:please give patient prescription for lab tests.)    ?Special instructions: -    I have read and understand the above information.    Patient Sign  Here:______________________________________Date:________Time:______    Staff Sign Here:________________________________________Date:_______Time:______      Radiology Departments:     ?[facility]: [phone] ?[place]: [phone]     ?[place]: [phone] ?[facility]:[phone]      ?[place]: [phone]  ?[place]: [phone]  ?[place]:[phone]    ?[facility]:[phone]  ?[facility]:[phone]      Please increase ASA dose to 324 mg( 4 baby aspirin) daily  Follow-up with PCP at AM for adjusting medication and started low dose statins

## 2020-10-08 NOTE — TELEPHONE ENCOUNTER
Patient needs to be seen today for follow up MRI.  Please put on at 11:30, check in 11:15 and notify patient.

## 2020-10-08 NOTE — ED PROVIDER NOTES
I spoke to Dr Lo , stroke neurologist in [facility], he reviewed all MRI and CT images, he recommended pt can be discharge with full dose 325 mg ASA, low dose Statin as per Lipid profile that can follow with PCP,   Pt understood and agreed

## 2020-10-08 NOTE — PATIENT INSTRUCTIONS
Lab today - thyroid TSH.  Thyroid ultrasound ordered.  Next step would be biopsy.    Audiogram ordered.    ENT referral placed - Dr Orona.    Neurology referral placed - Vanessa.    Add Lipitor 40 mg daily.  Continue Aspirin 325 mg daily.  Refrain from restarting smoking.

## 2020-10-12 ENCOUNTER — OFFICE VISIT (OUTPATIENT)
Dept: AUDIOLOGY | Facility: OTHER | Age: 70
End: 2020-10-12
Attending: AUDIOLOGIST
Payer: COMMERCIAL

## 2020-10-12 DIAGNOSIS — D33.3 ACOUSTIC NEUROMA (H): ICD-10-CM

## 2020-10-12 DIAGNOSIS — H90.3 SENSORINEURAL HEARING LOSS, ASYMMETRICAL: Primary | ICD-10-CM

## 2020-10-12 PROCEDURE — 92550 TYMPANOMETRY & REFLEX THRESH: CPT | Performed by: AUDIOLOGIST

## 2020-10-12 PROCEDURE — 92557 COMPREHENSIVE HEARING TEST: CPT | Performed by: AUDIOLOGIST

## 2020-10-12 NOTE — PROGRESS NOTES
Audiology Evaluation Completed. Please refer SCANNED AUDIOGRAM and/or TYMPANOGRAM for BACKGROUND, RESULTS, RECOMMENDATIONS.      Bhavya GREEN, [facility]  Audiologist #[id number]

## 2020-10-14 ENCOUNTER — HOSPITAL ENCOUNTER (OUTPATIENT)
Dept: ULTRASOUND IMAGING | Facility: HOSPITAL | Age: 70
Discharge: HOME | End: 2020-10-14
Attending: FAMILY MEDICINE | Admitting: FAMILY MEDICINE
Payer: COMMERCIAL

## 2020-10-14 DIAGNOSIS — E07.9 THYROID MASS: ICD-10-CM

## 2020-10-14 PROCEDURE — 76536 US EXAM OF HEAD AND NECK: CPT

## 2020-10-14 RX ORDER — LIDOCAINE HYDROCHLORIDE 10 MG/ML
10 INJECTION, SOLUTION EPIDURAL; INFILTRATION; INTRACAUDAL; PERINEURAL ONCE
Status: CANCELLED | OUTPATIENT
Start: 2020-10-20

## 2020-10-15 ENCOUNTER — TELEPHONE (OUTPATIENT)
Dept: FAMILY MEDICINE | Facility: OTHER | Age: 70
End: 2020-10-15

## 2020-10-15 NOTE — TELEPHONE ENCOUNTER
Pt taking it for a cerebellar infarct. Pt stated he was placed on 81mg some time ago and about a week and a half ago  increased it to the 325mg. office visit on 10/8/20 with  goes into more detail r/t.  Soco Luna LPN

## 2020-10-15 NOTE — TELEPHONE ENCOUNTER
Pt scheduled to have biopsy 10/20/20 & is currently on  aspirin 325 mg which they question r/t bleeding complications.  They advised pt to f/u with PCP on further medication instruction.  There is no holds needed for an 81 mg aspirin but the 325 mg would need to be held 5 days prior to procedure.   They are wondering if PCP would like to decrease dose to the Asprin 81 mg till after procedure or if the Asprin 325 mg can be held til after procedure, or if the procedure needs to be post-poned depending on the severity of need of the medication for pt.  Please Advise so I can call them back today with further directive.  Soco Luna LPN

## 2020-10-15 NOTE — TELEPHONE ENCOUNTER
Thank you diagnostic img updated. They stated he may go back up to the 325mg on 10/22/20 (being 24hrs after procedure per protocal)   They will inform pt of these instructions.    Soco Luna LPN

## 2020-10-19 ENCOUNTER — TELEPHONE (OUTPATIENT)
Dept: INTERVENTIONAL RADIOLOGY/VASCULAR | Facility: HOSPITAL | Age: 70
End: 2020-10-19

## 2020-10-20 ENCOUNTER — HOSPITAL ENCOUNTER (OUTPATIENT)
Dept: ULTRASOUND IMAGING | Facility: HOSPITAL | Age: 70
End: 2020-10-20
Attending: FAMILY MEDICINE
Payer: COMMERCIAL

## 2020-10-20 ENCOUNTER — TRANSFERRED RECORDS (OUTPATIENT)
Dept: HEALTH INFORMATION MANAGEMENT | Facility: CLINIC | Age: 70
End: 2020-10-20

## 2020-10-20 ENCOUNTER — HOSPITAL ENCOUNTER (OUTPATIENT)
Facility: HOSPITAL | Age: 70
Discharge: HOME | End: 2020-10-20
Attending: RADIOLOGY | Admitting: RADIOLOGY
Payer: COMMERCIAL

## 2020-10-20 VITALS
TEMPERATURE: 97 F | OXYGEN SATURATION: 96 % | SYSTOLIC BLOOD PRESSURE: 155 MMHG | DIASTOLIC BLOOD PRESSURE: 112 MMHG | HEART RATE: 87 BPM | RESPIRATION RATE: 16 BRPM

## 2020-10-20 DIAGNOSIS — E07.9 THYROID MASS: ICD-10-CM

## 2020-10-20 PROCEDURE — 999N001018 HC STATISTIC H-CELL BLOCK W/STAIN: Performed by: FAMILY MEDICINE

## 2020-10-20 PROCEDURE — 88305 TISSUE EXAM BY PATHOLOGIST: CPT | Mod: TC | Performed by: FAMILY MEDICINE

## 2020-10-20 PROCEDURE — 88173 CYTOPATH EVAL FNA REPORT: CPT | Mod: TC | Performed by: FAMILY MEDICINE

## 2020-10-20 PROCEDURE — 10005 FNA BX W/US GDN 1ST LES: CPT

## 2020-10-20 PROCEDURE — 250N000009 HC RX 250

## 2020-10-20 RX ORDER — LIDOCAINE HYDROCHLORIDE 10 MG/ML
INJECTION, SOLUTION EPIDURAL; INFILTRATION; INTRACAUDAL; PERINEURAL
Status: COMPLETED
Start: 2020-10-20 | End: 2020-10-20

## 2020-10-20 RX ORDER — LIDOCAINE HYDROCHLORIDE 10 MG/ML
10 INJECTION, SOLUTION EPIDURAL; INFILTRATION; INTRACAUDAL; PERINEURAL ONCE
Status: COMPLETED | OUTPATIENT
Start: 2020-10-20 | End: 2020-10-20

## 2020-10-20 RX ADMIN — LIDOCAINE HYDROCHLORIDE 9 ML: 10 INJECTION, SOLUTION EPIDURAL; INFILTRATION; INTRACAUDAL; PERINEURAL at 11:44

## 2020-10-20 NOTE — PROGRESS NOTES
Procedure: Fine Needle Biopsy, Thyroid, left    There were  no complications and patient has no symptoms..      Tolerated procedure well.    Patient to US.  Consent complete.  Supine on Ultrasound table.      Radiologist:Dr Wells    Time Out: Prior to the start of the procedure and with procedural staff participation, I verbally confirmed the patient s identity using two indicators, relevant allergies, that the procedure was appropriate and matched the consent or emergent situation, and that the correct equipment/implants were available. Immediately prior to starting the procedure I conducted the Time Out with the procedural staff and re-confirmed the patient s name, procedure, and site/side. (The Joint Commission universal protocol was followed.)  Yes    Position:  supine    Pain:  0    Sedation:None. Local Anesthestic used  No sedation    Estimated Blood Loss: * No surgery found *     Condition: Stable    Comments: See dictated procedure note for full details     Roseline Swanson RN          
ambulatory

## 2020-10-20 NOTE — IP AVS SNAPSHOT
[facility]  [address] [place] [address]  Phone: [phone]                                    After Visit Summary   10/20/2020    Kevyn Henriquez    MRN: [id number]           After Visit Summary Signature Page    I have received my discharge instructions, and my questions have been answered. I have discussed any challenges I see with this plan with the nurse or doctor.    ..........................................................................................................................................  Patient/Patient Representative Signature      ..........................................................................................................................................  Patient Representative Print Name and Relationship to Patient    ..................................................               ................................................  Date                                   Time    ..........................................................................................................................................  Reviewed by Signature/Title    ...................................................              ..............................................  Date                                               Time          22EPIC Rev 08/18

## 2020-10-20 NOTE — IP AVS SNAPSHOT
"                  MRN:[id number]                      After Visit Summary   10/20/2020    Kevyn Henriquez    MRN: [id number]           Visit Information        Provider Department      10/20/2020 11:30 AM [facility]; [facility]; [facility]           Review of your medicines      Notice    This visit is during an admission. Changes to the med list made in this visit will be reflected in the After Visit Summary of the admission.           Protect others around you: Learn how to safely use, store and throw away your medicines at www.disposemymeds.org.       Follow-ups after your visit       Your next 10 appointments already scheduled    Oct 22, 2020  9:30 AM  (Arrive by 9:15 AM)  [facility] [address]  [place] [address]  [phone]         Care Instructions       Further instructions from your care team           DISCHARGE INSTRUCTIONS  Needle Aspiration      IMPORTANT: As you prepare for discharge, the following information will help you return to your best level of health.               This Information Is About Your Follow Up Care     Call your doctor if you do not get better. Call sooner if you feel worse. You can reach your doctor by calling their clinic phone number.                                    This Information Is About Procedures      NEEDLE ASPIRATION  You have had a needle aspiration.  A needle aspiration (also called a \"fine needle biopsy\") is a procedure used to take a sample of cells or tissues from a lump or mass or other area of concern.  The sample of cells taken during the procedure can then be checked in the lab under a microscope to find out if there is cancer or other diseases.   Sometimes a needle aspiration is done to check the effect of treatment on a known tumor.    When you go home, follow these instructions:    Check the site frequently for bleeding, swelling, redness, or " "drainage.    Use an ice pack at the site for 20 minutes at a time if needed for pain.    Rest for the remainder of the day.    Don't drive for 24 hours if you received medicine to relax you during the procedure.    Return to your usual diet.    Do not take aspirin or anti-inflammatory medicines like ibuprofen or naproxen (check with your doctor if you take aspirin for heart disease or stroke).    You may take acetaminophen (Tylenol) for pain if needed.    Do not take blood thinner medicines until your doctor tells you to restart them.    Call your doctor if you have:    Continued bleeding    Swelling or a lump at the needle site    Pain not made better by acetaminophen    Fever or chills    Redness or drainage from the needle site    Chest pain or trouble breathing    Any questions or concerns                      Additional Information About Your Visit       [employer] Information    [employer] lets you send messages to your doctor, view your test results, renew your prescriptions, schedule appointments and more. To sign up, go to [email or web address] . Click on \"Log in\" on the left side of the screen, which will take you to the Welcome page. Then click on \"Sign up Now\" on the right side of the page.     You will be asked to enter the access code listed below, as well as some personal information. Please follow the directions to create your username and password.     Your access code is: [email or web address]  Expires: 2020  3:36 PM     Your access code will  in 60 days. If you need help or a new code, please call your   [facility] clinic or [phone].       Care EveryWhere ID    This is your Care EveryWhere ID. This could be used by other organizations to access your [place] medical records  [id number]       Your Vitals Were  Most recent update: 10/20/2020 11:36 AM    Blood Pressure   164/112   (BP Location: Left arm, Cuff Size: Adult Regular)    Pulse   87    Temperature   97  F (36.1  C) " (Oral)    Respirations   18    Pulse Oximetry   96%          Primary Care Provider Office Phone # Fax #    Carmelina Espinoza [phone]      Equal Access to Services    MARGARETKASSY DK : Hi ethel shaver leighanno Solisbethali, watikada luqadaha, qaybta kaalmada jaci, es fernandez harithaziggy cartwright larenkody lety. So [facility] [phone].    ATENCIÓN: Si habla español, tiene a [place] disposición servicios gratuitos de asistencia lingüística. Llame al [phone].    We comply with applicable federal and state civil rights laws, including the [place] Human Rights Act. We do not discriminate on the basis of race, color, creed, Yarsani, national origin, marital status, age, disability, sex, sexual orientation, or gender identity.       Thank you!    Thank you for choosing [facility] for your care. Our goal is always to provide you with excellent care. Hearing back from our patients is one way we can continue to improve our services. Please take a few minutes to complete the written survey that you may receive in the mail after you visit with us. Thank you!         Medication List     Notice    This visit is during an admission. Changes to the med list made in this visit will be reflected in the After Visit Summary of the admission.

## 2020-10-21 LAB — COPATH REPORT: NORMAL

## 2020-10-22 ENCOUNTER — OFFICE VISIT (OUTPATIENT)
Dept: OTOLARYNGOLOGY | Facility: OTHER | Age: 70
End: 2020-10-22
Attending: OTOLARYNGOLOGY
Payer: COMMERCIAL

## 2020-10-22 VITALS
WEIGHT: 216 LBS | BODY MASS INDEX: 32.84 KG/M2 | SYSTOLIC BLOOD PRESSURE: 126 MMHG | HEART RATE: 80 BPM | TEMPERATURE: 95.2 F | DIASTOLIC BLOOD PRESSURE: 84 MMHG | OXYGEN SATURATION: 96 %

## 2020-10-22 DIAGNOSIS — H90.3 ASNHL (ASYMMETRICAL SENSORINEURAL HEARING LOSS): ICD-10-CM

## 2020-10-22 DIAGNOSIS — E07.9 THYROID MASS: ICD-10-CM

## 2020-10-22 DIAGNOSIS — H54.7 LOSS OF VISION: Primary | ICD-10-CM

## 2020-10-22 DIAGNOSIS — Z71.6 ENCOUNTER FOR TOBACCO USE CESSATION COUNSELING: ICD-10-CM

## 2020-10-22 DIAGNOSIS — I63.9 CEREBELLAR INFARCT (H): ICD-10-CM

## 2020-10-22 DIAGNOSIS — D33.3 ACOUSTIC NEUROMA (H): ICD-10-CM

## 2020-10-22 PROCEDURE — G0463 HOSPITAL OUTPT CLINIC VISIT: HCPCS

## 2020-10-22 PROCEDURE — 31575 DIAGNOSTIC LARYNGOSCOPY: CPT | Performed by: OTOLARYNGOLOGY

## 2020-10-22 PROCEDURE — 99204 OFFICE O/P NEW MOD 45 MIN: CPT | Mod: 25 | Performed by: OTOLARYNGOLOGY

## 2020-10-22 ASSESSMENT — PAIN SCALES - GENERAL: PAINLEVEL: NO PAIN (0)

## 2020-10-22 NOTE — NURSING NOTE
"Chief Complaint   Patient presents with     Consult     Thyroid Mass, Acoustic Neuroma; Referred by Dr. Carmelina Espinoza       Initial /84   Pulse 80   Temp 95.2  F (35.1  C) (Tympanic)   Wt 98 kg (216 lb)   SpO2 96%   BMI 32.84 kg/m   Estimated body mass index is 32.84 kg/m  as calculated from the following:    Height as of 10/8/20: 1.727 m (5' 8\").    Weight as of this encounter: 98 kg (216 lb).  Medication Reconciliation: complete  Melissa Garibay LPN  "

## 2020-10-22 NOTE — LETTER
10/22/2020         RE: Kevyn Henriquez  [address] [place] [address]        Dear Colleague,    Thank you for referring your patient, Kevyn Henriquez, to the [facility]. Please see a copy of my visit note below.      See complete note      Otolaryngology Consultation    Patient: Kevyn Henriquez  : 1950    Patient presents with:  Consult: Thyroid Mass, Acoustic Neuroma; Referred by Dr. Carmelina Espinoza      HPI:  Kevyn Henriquez is a 69 year old male seen today at the request of Carmelina Espinoza  for evaluation of a left thyroid nodule as well as a right acoustic neuroma.    This nodule was found incidentally on CTA neck on 10/7.  The CTA was negative for stenosis or aneurysm.  He had an MRI of the brain ordered on 10 7 for an acute headache.  On that imaging it was felt there was an acute infarct in the right cerebellar tonsil which led to the CTA.  There was also an incidental finding of a right acoustic neuroma on MRI.    The patient denies dysphonia, dysphagia or any other compressive symptoms.    Since he was told he had a thyroid nodule he is somewhat more aware of this anatomy and occasionally notices some fullness in his left lower neck but this is not bothersome to him.    There is no family history of thyroid cancer, and no personal history of head or neck irradiation.    The thyroid ultrasound was personally reviewed.  This is dated 10/14/2020  The dominant nodule(s) measures 5.7 cm in the LEFT lobe.  The nodule solid and has smooth margins  There is no cervical lymphadenopathy noted on the CTA.      The large left thyroid mass is noted to deviate the trachea to the right    USFNAB performed on 10/20 shows a benign left nodule    Thyroid labs were reviewed on 10/8.  TSH was normal at 1.04.    Calcium normal at 8.8 on 10/7    Here with his wife Sendy      The patient denies tremor, hair loss or weight loss.     PROCEDURE: US THYROID 10/14/2020      FINDINGS: Right  lobe of the thyroid measures 5.1 x 2.3 x 2.2 cm. Left  lobe measures 7.0 x 4.0 x 4.3 cm. Isthmus measures 2 mm.     There is a large fairly smoothly marginated solid iso and hyperechoic  mass in the mid lobe and lower pole of the left lobe of the thyroid.  This measures 5.7 x 4.2 x 3.6 cm.    IMPRESSION: Dominant mass left lobe of thyroid. FNA is recommended as  this is somewhat suspicious.    CYTOLOGIC INTERPRETATION:   Patient Name: REBECCA TOMPKINS   MR#: [id number]    Thyroid, ultrasound guided fine needle aspiration, left nodule:     Benign   Consistent with a benign nodule (includes adenomatoid nodule, colloid   nodule, etc.)       He had an MRI of the brain ordered on 10 7 for an acute headache.  On that imaging it was felt there was an acute infarct in the right cerebellar tonsil which led to the CTA.    There was an incidental finding on MRI of the brain of a 7 mm right internal auditory canal enhancing mass.  The lesion is intracanalicular    Audiogram dated 10/12/2020 shows an moderate to severe sloping to severe to profound right asymmetrical sensorineural hearing loss there is a mild sloping to moderate left sensorineural hearing loss.  Discrimination is 20% on the right SRT 80 dB.  On the left discrimination is 100% and SRT 30 dB type a tympanograms bilaterally.  He has 10-year-old hearing aids through [employer] which she has not worn for over 5 years.  He states his right ear was worse at the time he is fit for hearing aids.    His hearing has been slowly declining in the right ear for years.    He denies vertigo tinnitus or fluctuating hearing loss.  he denies problematic aural fullness  He occasionally has imbalance but also has suffered bilateral vision loss following his recent stroke   He has seen a local optometrist but has not seen an opthalmologist    Significant hx of occupational noise exposure  He was more exposed to noise on his right side  HA purchased through [employer] years ago, he  was never referred to an ENT    He denies facial weakness or facial numbness    Current Outpatient Rx   Medication Sig Dispense Refill     aspirin 81 MG tablet Take 325 mg by mouth daily       atorvastatin (LIPITOR) 40 MG tablet Take 1 tablet (40 mg) by mouth daily 90 tablet 3     UNABLE TO FIND 1 tablet 2 times daily preservision with vitamins         Allergies: Patient has no known allergies.     Past Medical History:   Diagnosis Date     Tobacco abuse        Past Surgical History:   Procedure Laterality Date     COLONOSCOPY N/A 6/25/2018    Procedure: COLONOSCOPY;  COLONOSCOPY WITH  POLYPECTOMY;  Surgeon: Marino Arzola MD;  Location: [facility]     EXCISE GANGLION WRIST Left 4/30/2015    Procedure: EXCISE GANGLION WRIST;  Surgeon: Phyllis Tuttle MD;  Location: [facility]     EXCISE MASS NECK N/A 4/30/2015    Procedure: EXCISE MASS NECK;  Surgeon: Phyllis Tuttle MD;  Location: [facility]     HC EXCIS PRIMARY GANGLION WRIST      left wrist       ENT family history reviewed    Social History     Tobacco Use     Smoking status: Current Every Day Smoker     Packs/day: 0.50     Types: Cigarettes     Smokeless tobacco: Never Used   Substance Use Topics     Alcohol use: No     Drug use: No       Review of Systems  ROS: 10 point ROS neg other than the symptoms noted above in the HPI and blurred vision, eye floaters, neck pain    Physical Exam  /84   Pulse 80   Temp 95.2  F (35.1  C) (Tympanic)   Wt 98 kg (216 lb)   SpO2 96%   BMI 32.84 kg/m      General - The patient is well nourished and well developed, and appears to have good nutritional status.  Alert and oriented to person and place, answers questions and cooperates with examination appropriately.   Poor vision, relying on auditory cues throughout exam and discussion.    Head and Face - Normocephalic and atraumatic, with no gross asymmetry noted.  The facial nerve is intact, with strong symmetric movements.  Grade 1/6 bilaterally  Gait intact, no spontaneous  nystagmus  Shoulder shrug symmetric, tongue midline  Voice and Breathing - The patient was breathing comfortably without the use of accessory muscles. There was no wheezing, stridor, or stertor.  The patients voice was clear and strong, and had appropriate pitch and quality.  Ears -The external auditory canals are patent, the tympanic membranes are intact without effusion, retraction or mass.  Bony landmarks are intact.  Eyes - Extraocular movements intact, and the pupils were reactive to light.  Sclera were not icteric or injected, conjunctiva were pink and moist.  Mouth - Examination of the oral cavity showed pink, healthy oral mucosa. No lesions or ulcerations noted.  The tongue was mobile and midline, and the dentition were in fair condition.    Throat - The walls of the oropharynx were smooth, pink, moist, symmetric, and had no lesions or ulcerations.  The tonsillar pillars and soft palate were symmetric.  The uvula was midline on elevation.    Neck -5.0 cm mid through lower pole left thyroid mass.  Mobile, mildly tender post fnab.  Overlying skin ecchymotic.  Trachea deviated left.  No palpable right nodule.  Palpation of the central neck, occipital, submental, submandibular, internal jugular chain, and supraclavicular nodes did not demonstrate any abnormal lymph nodes or masses.    Nose - External contour is symmetric, no gross deflection or scars.  Nasal mucosa is pink and moist with no abnormal mucus.  The septum was intact, turbinates of normal size and position.  No polyps, masses, or purulence noted on examination.    Attempts at mirror laryngoscopy were not possible due to gag reflex.  Therefore I proceeded with a fiberoptic examination after informed consent.  First I sprayed both sides of the nose with a mixture of lidocaine and neosynephrine.  I then passed the scope through the nasal cavity.  The nasal cavity was unremarkable.  The nasopharynx was mucosally covered and symmetric.  The eustachian  tube openings were unobstructed.  Going further, the base of tongue was free of lesions, and the vallecula was open.  The epiglottis was smooth and mucosally covered.  The supraglottic larynx was then clearly visualized.  The vocal cords moved smoothly and symmetrically.  The pyriform sinuses were open and without stew mass or pooling of secretions, and the limited view of the postcricoid region did not show any lesions.  The patient tolerated the procedure well.        Impression and Plan- Kevyn Henriquez is a 69 year old male with:    ICD-10-CM    1. Loss of vision  H54.7 EYE ADULT REFERRAL   2. Left thyroid nodule  E07.9 CT Soft Tissue Neck w Contrast     ENDOCRINOLOGY ADULT REFERRAL     US Thyroid    5.7 cm left nodule, benign fnab   3. Right acoustic neuroma (H)  D33.3 OTOLARYNGOLOGY REFERRAL   4. ASNHL (asymmetrical sensorineural hearing loss)  H90.5    5. Cerebellar infarct (H)  I63.9 EYE ADULT REFERRAL         We discussed the options in this situation including surveillance with close follow up versus left thyroid lobectomy.  He has multiple medical issues that need to be addressed, most pressing is his upcoming neurology consultation with Dr. Eric.      Also, I educated the patient about how common thyroid nodules are, and the fact that there is actually a low probability of this being a malignancy with a benign fnab.    However, the left nodule is over 4 cm and he is a male over 55 years, so this nodule should be followed closely.    I do want him to see endocrinology for their opinion on surveillance vs lobectomy.     We also discussed the importance of follow up due to the possibility of false negatives with testing.      Thyroid anatomy was discussed, and thyroid surgery was briefly discussed.     We also spoke about acoustic neuromas, verbal and written education provided.    I recommend surveillance at this juncture but will see if Dr. Morales would like to see Bhupendra.  His hearing has been slowly  declining in the right ear for years.  Consider CROSS.  No complaints of excess difficulty communicating at this juncture.     Make an appointment at [facility] with Dr. Merritt  Keep the appointment with Neurology.  Up[place] neurology visit consultation [place] 4[place] [facility] [place]  [place]   Complete Thyroid US in 4 Months  Follow up with Endocrinology and Otology  Follow up with Dr. Orona in 2-3 Months      What is an Acoustic Neuroma?  An acoustic neuroma (sometimes called a neurinoma or vestibular schwannoma) is a benign or non-cancerous growth that arises from the 8th or vestibulochochlear verve.  The 8th nerve is actually 2 separate nerves-the vestibular nerve and the cochlear nerve.  The vestibular nerve is responsible for balance while the cochlear nerve is responsible for hearing.  The vestibular nerve has 2 parts-the superior vestibular nerve (SVN) and the inferior vestibular nerve (IVN).  Please refer to the website below for information on acoustic neuromas.    [email or web address]      Tobacco cessation was strongly encouraged.  The associated risk of head and neck cancer was discussed.  Every year of cessation offers health benefits. This was discussed with the patient today and they voiced understanding.  Quit tools and a nicotine patch were offered.          Celine Orona D.O.  Otolaryngology/Head and Neck Surgery  Allergy            Again, thank you for allowing me to participate in the care of your patient.        Sincerely,        Celine Orona MD

## 2020-10-22 NOTE — PATIENT INSTRUCTIONS
Thank you for allowing Dr. Orona and our ENT team to participate in your care.  If your medications are too expensive, please give the nurse a call.  We can possibly change this medication.  If you have a scheduling or an appointment question please contact our Health Unit Coordinator at their direct line [phone].   ALL nursing questions or concerns can be directed to your ENT nurse at: [phone] - Gilda    Make an appointment at [facility] with Dr. Merritt  Keep the appointment with Neurology  Complete Neck CT   Complete Thyroid US in 4 Months  Follow up with Endocrinology and Otology  Follow up with Dr. Orona in 2-3 Months    What is an Acoustic Neuroma?  An acoustic neuroma (sometimes called a neurinoma or vestibular schwannoma) is a benign or non-cancerous growth that arises from the 8th or vestibulochochlear verve.  The 8th nerve is actually 2 separate nerves-the vestibular nerve and the cochlear nerve.  The vestibular nerve is responsible for balance while the cochlear nerve is responsible for hearing.  The vestibular nerve has 2 parts-the superior vestibular nerve (SVN) and the inferior vestibular nerve (IVN).  Please refer to the website below for information on acoustic neuromas.     [email or web address]     Reference:  Dr. Gulshan Welch M.D, [facility].

## 2020-10-22 NOTE — PROGRESS NOTES
Otolaryngology Consultation    Patient: Kevyn Henriquez  : 1950    Patient presents with:  Consult: Thyroid Mass, Acoustic Neuroma; Referred by Dr. Carmelina Espinoza      HPI:  Kevyn Henriquez is a 69 year old male seen today at the request of Carmelina Espinoza  for evaluation of a left thyroid nodule as well as a right acoustic neuroma.    This nodule was found incidentally on CTA neck on 10/7.  The CTA was negative for stenosis or aneurysm.  He had an MRI of the brain ordered on 10 7 for an acute headache.  On that imaging it was felt there was an acute infarct in the right cerebellar tonsil which led to the CTA.  There was also an incidental finding of a right acoustic neuroma on MRI.    The patient denies dysphonia, dysphagia or any other compressive symptoms.    Since he was told he had a thyroid nodule he is somewhat more aware of this anatomy and occasionally notices some fullness in his left lower neck but this is not bothersome to him.    There is no family history of thyroid cancer, and no personal history of head or neck irradiation.    The thyroid ultrasound was personally reviewed.  This is dated 10/14/2020  The dominant nodule(s) measures 5.7 cm in the LEFT lobe.  The nodule solid and has smooth margins  There is no cervical lymphadenopathy noted on the CTA.      The large left thyroid mass is noted to deviate the trachea to the right    USFNAB performed on 10/20 shows a benign left nodule    Thyroid labs were reviewed on 10/8.  TSH was normal at 1.04.    Calcium normal at 8.8 on 10/7    Here with his wife Sendy      The patient denies tremor, hair loss or weight loss.     PROCEDURE: US THYROID 10/14/2020      FINDINGS: Right lobe of the thyroid measures 5.1 x 2.3 x 2.2 cm. Left  lobe measures 7.0 x 4.0 x 4.3 cm. Isthmus measures 2 mm.     There is a large fairly smoothly marginated solid iso and hyperechoic  mass in the mid lobe and lower pole of the left lobe of the thyroid.  This measures  5.7 x 4.2 x 3.6 cm.    IMPRESSION: Dominant mass left lobe of thyroid. FNA is recommended as  this is somewhat suspicious.    CYTOLOGIC INTERPRETATION:   Patient Name: REBECCA TOMPKINS   MR#: [id number]    Thyroid, ultrasound guided fine needle aspiration, left nodule:     Benign   Consistent with a benign nodule (includes adenomatoid nodule, colloid   nodule, etc.)       He had an MRI of the brain ordered on 10 7 for an acute headache.  On that imaging it was felt there was an acute infarct in the right cerebellar tonsil which led to the CTA.    There was an incidental finding on MRI of the brain of a 7 mm right internal auditory canal enhancing mass.  The lesion is intracanalicular    Audiogram dated 10/12/2020 shows an moderate to severe sloping to severe to profound right asymmetrical sensorineural hearing loss there is a mild sloping to moderate left sensorineural hearing loss.  Discrimination is 20% on the right SRT 80 dB.  On the left discrimination is 100% and SRT 30 dB type a tympanograms bilaterally.  He has 10-year-old hearing aids through [employer] which she has not worn for over 5 years.  He states his right ear was worse at the time he is fit for hearing aids.    His hearing has been slowly declining in the right ear for years.    He denies vertigo tinnitus or fluctuating hearing loss.  he denies problematic aural fullness  He occasionally has imbalance but also has suffered bilateral vision loss following his recent stroke   He has seen a local optometrist but has not seen an opthalmologist    Significant hx of occupational noise exposure  He was more exposed to noise on his right side  HA purchased through [email or web address] years ago, he was never referred to an ENT    He denies facial weakness or facial numbness    Current Outpatient Rx   Medication Sig Dispense Refill     aspirin 81 MG tablet Take 325 mg by mouth daily       atorvastatin (LIPITOR) 40 MG tablet Take 1 tablet (40 mg) by mouth daily 90  tablet 3     UNABLE TO FIND 1 tablet 2 times daily preservision with vitamins         Allergies: Patient has no known allergies.     Past Medical History:   Diagnosis Date     Tobacco abuse        Past Surgical History:   Procedure Laterality Date     COLONOSCOPY N/A 6/25/2018    Procedure: COLONOSCOPY;  COLONOSCOPY WITH  POLYPECTOMY;  Surgeon: Marino Arzola MD;  Location: [facility]     EXCISE GANGLION WRIST Left 4/30/2015    Procedure: EXCISE GANGLION WRIST;  Surgeon: Phyllis Tuttle MD;  Location: [facility]     EXCISE MASS NECK N/A 4/30/2015    Procedure: EXCISE MASS NECK;  Surgeon: Phyllis Tuttle MD;  Location: [facility]     HC EXCIS PRIMARY GANGLION WRIST      left wrist       ENT family history reviewed    Social History     Tobacco Use     Smoking status: Current Every Day Smoker     Packs/day: 0.50     Types: Cigarettes     Smokeless tobacco: Never Used   Substance Use Topics     Alcohol use: No     Drug use: No       Review of Systems  ROS: 10 point ROS neg other than the symptoms noted above in the HPI and blurred vision, eye floaters, neck pain    Physical Exam  /84   Pulse 80   Temp 95.2  F (35.1  C) (Tympanic)   Wt 98 kg (216 lb)   SpO2 96%   BMI 32.84 kg/m      General - The patient is well nourished and well developed, and appears to have good nutritional status.  Alert and oriented to person and place, answers questions and cooperates with examination appropriately.   Poor vision, relying on auditory cues throughout exam and discussion.    Head and Face - Normocephalic and atraumatic, with no gross asymmetry noted.  The facial nerve is intact, with strong symmetric movements.  Grade 1/6 bilaterally  Gait intact, no spontaneous nystagmus  Shoulder shrug symmetric, tongue midline  Voice and Breathing - The patient was breathing comfortably without the use of accessory muscles. There was no wheezing, stridor, or stertor.  The patients voice was clear and strong, and had appropriate pitch and  quality.  Ears -The external auditory canals are patent, the tympanic membranes are intact without effusion, retraction or mass.  Bony landmarks are intact.  Eyes - Extraocular movements intact, and the pupils were reactive to light.  Sclera were not icteric or injected, conjunctiva were pink and moist.  Mouth - Examination of the oral cavity showed pink, healthy oral mucosa. No lesions or ulcerations noted.  The tongue was mobile and midline, and the dentition were in fair condition.    Throat - The walls of the oropharynx were smooth, pink, moist, symmetric, and had no lesions or ulcerations.  The tonsillar pillars and soft palate were symmetric.  The uvula was midline on elevation.    Neck -5.0 cm mid through lower pole left thyroid mass.  Mobile, mildly tender post fnab.  Overlying skin ecchymotic.  Trachea deviated left.  No palpable right nodule.  Palpation of the central neck, occipital, submental, submandibular, internal jugular chain, and supraclavicular nodes did not demonstrate any abnormal lymph nodes or masses.    Nose - External contour is symmetric, no gross deflection or scars.  Nasal mucosa is pink and moist with no abnormal mucus.  The septum was intact, turbinates of normal size and position.  No polyps, masses, or purulence noted on examination.    Attempts at mirror laryngoscopy were not possible due to gag reflex.  Therefore I proceeded with a fiberoptic examination after informed consent.  First I sprayed both sides of the nose with a mixture of lidocaine and neosynephrine.  I then passed the scope through the nasal cavity.  The nasal cavity was unremarkable.  The nasopharynx was mucosally covered and symmetric.  The eustachian tube openings were unobstructed.  Going further, the base of tongue was free of lesions, and the vallecula was open.  The epiglottis was smooth and mucosally covered.  The supraglottic larynx was then clearly visualized.  The vocal cords moved smoothly and  symmetrically.  The pyriform sinuses were open and without stew mass or pooling of secretions, and the limited view of the postcricoid region did not show any lesions.  The patient tolerated the procedure well.        Impression and Plan- Kevyn Henriquez is a 69 year old male with:    ICD-10-CM    1. Loss of vision  H54.7 EYE ADULT REFERRAL   2. Left thyroid nodule  E07.9 CT Soft Tissue Neck w Contrast     ENDOCRINOLOGY ADULT REFERRAL     US Thyroid    5.7 cm left nodule, benign fnab   3. Right acoustic neuroma (H)  D33.3 OTOLARYNGOLOGY REFERRAL   4. ASNHL (asymmetrical sensorineural hearing loss)  H90.5    5. Cerebellar infarct (H)  I63.9 EYE ADULT REFERRAL         We discussed the options in this situation including surveillance with close follow up versus left thyroid lobectomy.  He has multiple medical issues that need to be addressed, most pressing is his upcoming neurology consultation with Dr. Eric.      Also, I educated the patient about how common thyroid nodules are, and the fact that there is actually a low probability of this being a malignancy with a benign fnab.    However, the left nodule is over 4 cm and he is a male over 55 years, so this nodule should be followed closely.    I do want him to see endocrinology for their opinion on surveillance vs lobectomy.     We also discussed the importance of follow up due to the possibility of false negatives with testing.      Thyroid anatomy was discussed, and thyroid surgery was briefly discussed.     We also spoke about acoustic neuromas, verbal and written education provided.    I recommend surveillance at this juncture but will see if Dr. Morales would like to see Bhupendra.  His hearing has been slowly declining in the right ear for years.  Consider CROSS.  No complaints of excess difficulty communicating at this juncture.     Make an appointment at [facility] with Dr. Merritt  Keep the appointment with Neurology.  Upcoming neurology visit consultation  November 4th [facility]  Complete Neck CT   Complete Thyroid US in 4 Months  Follow up with Endocrinology and Otology  Follow up with Dr. Orona in 2-3 Months      What is an Acoustic Neuroma?  An acoustic neuroma (sometimes called a neurinoma or vestibular schwannoma) is a benign or non-cancerous growth that arises from the 8th or vestibulochochlear verve.  The 8th nerve is actually 2 separate nerves-the vestibular nerve and the cochlear nerve.  The vestibular nerve is responsible for balance while the cochlear nerve is responsible for hearing.  The vestibular nerve has 2 parts-the superior vestibular nerve (SVN) and the inferior vestibular nerve (IVN).  Please refer to the website below for information on acoustic neuromas.    [email or web address]      Tobacco cessation was strongly encouraged.  The associated risk of head and neck cancer was discussed.  Every year of cessation offers health benefits. This was discussed with the patient today and they voiced understanding.  Quit tools and a nicotine patch were offered.          Celine Orona D.O.  Otolaryngology/Head and Neck Surgery  Allergy

## 2020-10-23 ENCOUNTER — TELEPHONE (OUTPATIENT)
Dept: OTOLARYNGOLOGY | Facility: CLINIC | Age: 70
End: 2020-10-23

## 2020-10-26 NOTE — TELEPHONE ENCOUNTER
FUTURE VISIT INFORMATION      FUTURE VISIT INFORMATION:    Date: 11/1/2020    Time: 9:45AM    [place] [facility] [place]:    Referring provider:  Celine Orona MD    Referring providers clinic:  [facility] ENT     Reason for visit/diagnosis  Telephone visit -Acoustic neuroma - Referred by Celine Orona MD in  ENT. MRI from 10/7 in PACS    RECORDS REQUESTED FROM:       Clinic name Comments Records Status Imaging Status     [facility] ENT and audiology  10/22/2020 referral and note from Celine Orona MD  10/12/2020 audiogram from Bhavya Thompson [facility]  10/8/2020 note from Dr Carmelina Espinoza EPIC    Imaging 10/7/2020 MR Brain   10/7/2020 CTA [facility] PACS

## 2020-10-30 NOTE — PROGRESS NOTES
"Kevyn Henriquez is a 69 year old male who is being evaluated via a billable telephone visit.      The patient has been notified of following:     \"This telephone visit will be conducted via a call between you and your physician/provider. We have found that certain health care needs can be provided without the need for a physical exam.  This service lets us provide the care you need with a short phone conversation.  If a prescription is necessary we can send it directly to your pharmacy.  If lab work is needed we can place an order for that and you can then stop by our lab to have the test done at a later time.    Telephone visits are billed at different rates depending on your insurance coverage. During this emergency period, for some insurers they may be billed the same as an in-person visit.  Please reach out to your insurance provider with any questions.    If during the course of the call the physician/provider feels a telephone visit is not appropriate, you will not be charged for this service.\"    Patient has given verbal consent for Telephone visit?  Yes    What phone number would you like to be contacted at?     How would you like to obtain your AVS? Mail a copy    Phone call duration: 7 minutes          Otolaryngology Phone Visit    Presents for a phone visit regarding several questions about his thyroid nodule, acoustic neuroma and the timing of upcoming consults.    Bhupendra has a 5.7 cm left thyroid nodule.  Ultrasound fine-needle aspiration on 1020 was benign.  He is asymptomatic without dysphonia dysphagia or any compressive symptoms there is no family history of thyroid cancer no personal history of head or neck radiation flexible laryngoscopy on 1022 showed normal vocal cord mobility.       Thyroid labs were reviewed on 10/8.  TSH was normal at 1.04.    Calcium normal at 8.8 on 10/7       Bhupendra states he would like to have his thyroid removed.  The thought that it may be a cancer or become a thyroid " cancer bothers him.  He understands fnab was benign.    He also has a 7 mm right internal auditory canal mass with a right asymmetrical sensorineural hearing loss discrimination 20% on the right SRT 80 dB on the left SRT 30 dB with 100% discrimination he has an upcoming appointment with Dr. Morales.    He is a current everyday smoker  He had upcoming neurology consultation with Dr. Sanches as all of these findings were incidentally noted during work-up of a right cerebellar infarct.  I did also want him to see endocrinology  He is also noted change in vision since his infarct and was sent to ophthalmology    He had an appointment with Dr. Valentine, endocrine in [place].  He cancelled due to difficulty with his vision and inability to drive to [place].     Dr. Merritt, optho, has seen Bhupendra and was not given a definitive answer.        Impression/Plan    ICD-10-CM    1. Left thyroid nodule  E04.1 ENDOCRINOLOGY ADULT REFERRAL   2. Right acoustic neuroma (H)  D33.3    3. ASNHL (asymmetrical sensorineural hearing loss)  H90.5    4. Tobacco abuse  Z72.0          Contact [facility] to see if they will perform a telephone visit for the left thyroid nodule.      CT neck pending    Dr. Morales consult pending for right acoustic and right ASNHL    He is seeing Dr. Eric at [facility] tomorrow.     As long as he is medically stable per neuro and Dr. Espinoza, I will proceed with Left thyroid lobectomy with frozens, possible total.  I would like to see Bhupendra back to discuss this after the above consults are completed.    Tobacco cessation was strongly encouraged.  The associated risk of head and neck cancer was discussed.  Every year of cessation offers health benefits. This was discussed with the patient today and they voiced understanding.  Quit tools and a nicotine patch were offered.        Phone call duration: 7 minutes, with chart review and coordination of care total time 25 minutes    Celine Orona,  CARMELO.O.  Otolaryngology/Head and Neck Surgery  Allergy

## 2020-11-03 ENCOUNTER — VIRTUAL VISIT (OUTPATIENT)
Dept: OTOLARYNGOLOGY | Facility: OTHER | Age: 70
End: 2020-11-03
Attending: OTOLARYNGOLOGY
Payer: COMMERCIAL

## 2020-11-03 VITALS — WEIGHT: 216 LBS | BODY MASS INDEX: 32.74 KG/M2 | HEIGHT: 68 IN

## 2020-11-03 DIAGNOSIS — E04.1 THYROID NODULE: Primary | ICD-10-CM

## 2020-11-03 DIAGNOSIS — Z72.0 TOBACCO ABUSE: ICD-10-CM

## 2020-11-03 DIAGNOSIS — H90.3 ASNHL (ASYMMETRICAL SENSORINEURAL HEARING LOSS): ICD-10-CM

## 2020-11-03 DIAGNOSIS — D33.3 ACOUSTIC NEUROMA (H): ICD-10-CM

## 2020-11-03 PROCEDURE — 99213 OFFICE O/P EST LOW 20 MIN: CPT | Mod: 95 | Performed by: OTOLARYNGOLOGY

## 2020-11-03 RX ORDER — ASPIRIN 325 MG
325 TABLET ORAL DAILY
COMMUNITY

## 2020-11-03 ASSESSMENT — PAIN SCALES - GENERAL: PAINLEVEL: NO PAIN (0)

## 2020-11-03 ASSESSMENT — MIFFLIN-ST. JEOR: SCORE: 1729.27

## 2020-11-03 NOTE — LETTER
"    11/3/2020         RE: Kevyn Henriquez  [address]  [place] [address]        Dear Colleague,    Thank you for referring your patient, Kevyn Henriquez, to the [facility]. Please see a copy of my visit note below.    Kevyn Henriquez is a 69 year old male who is being evaluated via a billable telephone visit.      The patient has been notified of following:     \"This telephone visit will be conducted via a call between you and your physician/provider. We have found that certain health care needs can be provided without the need for a physical exam.  This service lets us provide the care you need with a short phone conversation.  If a prescription is necessary we can send it directly to your pharmacy.  If lab work is needed we can place an order for that and you can then stop by our lab to have the test done at a later time.    Telephone visits are billed at different rates depending on your insurance coverage. During this emergency period, for some insurers they may be billed the same as an in-person visit.  Please reach out to your insurance provider with any questions.    If during the course of the call the physician/provider feels a telephone visit is not appropriate, you will not be charged for this service.\"    Patient has given verbal consent for Telephone visit?  Yes    What phone number would you like to be contacted at?     How would you like to obtain your AVS? Mail a copy    Phone call duration: 7 minutes          Otolaryngology Phone Visit    Presents for a phone visit regarding several questions about his thyroid nodule, acoustic neuroma and the timing of upcoming consults.    Bhupendra has a 5.7 cm left thyroid nodule.  Ultrasound fine-needle aspiration on 1020 was benign.  He is asymptomatic without dysphonia dysphagia or any compressive symptoms there is no family history of thyroid cancer no personal history of head or neck radiation flexible laryngoscopy on 1022 showed " normal vocal cord mobility.       Thyroid labs were reviewed on 10/8.  TSH was normal at 1.04.    Calcium normal at 8.8 on 10/7       Bhupendra states he would like to have his thyroid removed.  The thought that it may be a cancer or become a thyroid cancer bothers him.  He understands fnab was benign.    He also has a 7 mm right internal auditory canal mass with a right asymmetrical sensorineural hearing loss discrimination 20% on the right SRT 80 dB on the left SRT 30 dB with 100% discrimination he has an upcoming appointment with Dr. Morales.    He is a current everyday smoker  He had upcoming neurology consultation with Dr. Sanches as all of these findings were incidentally noted during work-up of a right cerebellar infarct.  I did also want him to see endocrinology  He is also noted change in vision since his infarct and was sent to ophthalmology    He had an appointment with Dr. Valentine, endocrine in [place].  He cancelled due to difficulty with his vision and inability to drive to [place].     Dr. Merritt, optho, has seen Bhupendra and was not given a definitive answer.        Impression/Plan    ICD-10-CM    1. Left thyroid nodule  E04.1 ENDOCRINOLOGY ADULT REFERRAL   2. Right acoustic neuroma (H)  D33.3    3. ASNHL (asymmetrical sensorineural hearing loss)  H90.5    4. Tobacco abuse  Z72.0          Contact [facility] to see if they will perform a telephone visit for the left thyroid nodule.      CT neck pending    Dr. Morales consult pending for right acoustic and right ASNHL    He is seeing Dr. Eric at [facility] tomorrow.     As long as he is medically stable per neuro and Dr. Espinoza, I will proceed with Left thyroid lobectomy with frozens, possible total.  I would like to see Bhupendra back to discuss this after the above consults are completed.    Tobacco cessation was strongly encouraged.  The associated risk of head and neck cancer was discussed.  Every year of cessation offers health benefits. This was discussed with  the patient today and they voiced understanding.  Quit tools and a nicotine patch were offered.        Phone call duration: 7 minutes, with chart review and coordination of care total time 25 minutes    Celine Orona D.O.  Otolaryngology/Head and Neck Surgery  Allergy            Again, thank you for allowing me to participate in the care of your patient.        Sincerely,        Celine Orona MD

## 2020-11-03 NOTE — NURSING NOTE
"Chief Complaint   Patient presents with     RECHECK     Follow Up Loss of Vision, Left Thyroid Nodule, Right Acoustic Neuroma, ASNHL, Cerebellar Infarct       Initial Ht 1.727 m (5' 8\")   Wt 98 kg (216 lb)   BMI 32.84 kg/m   Estimated body mass index is 32.84 kg/m  as calculated from the following:    Height as of this encounter: 1.727 m (5' 8\").    Weight as of this encounter: 98 kg (216 lb).  Medication Reconciliation: complete  Gilda Ding LPN    "

## 2020-11-03 NOTE — PATIENT INSTRUCTIONS
Thank you for allowing Dr. Orona and our ENT team to participate in your care.  If your medications are too expensive, please give the nurse a call.  We can possibly change this medication.  If you have a scheduling or an appointment question please contact our Health Unit Coordinator at their direct line [phone].   ALL nursing questions or concerns can be directed to your ENT nurse at: [phone] - Gilda    Follow up for a virtual visit (telephone) with Endocrinology at the [facility]    Maintain the appointment with neurology as scheduled

## 2020-11-04 ENCOUNTER — TRANSFERRED RECORDS (OUTPATIENT)
Dept: HEALTH INFORMATION MANAGEMENT | Facility: CLINIC | Age: 70
End: 2020-11-04

## 2020-11-05 ENCOUNTER — HOSPITAL ENCOUNTER (OUTPATIENT)
Dept: CT IMAGING | Facility: HOSPITAL | Age: 70
End: 2020-11-05
Attending: OTOLARYNGOLOGY
Payer: COMMERCIAL

## 2020-11-05 ENCOUNTER — OFFICE VISIT (OUTPATIENT)
Dept: FAMILY MEDICINE | Facility: OTHER | Age: 70
End: 2020-11-05
Attending: FAMILY MEDICINE
Payer: COMMERCIAL

## 2020-11-05 VITALS
HEIGHT: 68 IN | OXYGEN SATURATION: 96 % | HEART RATE: 82 BPM | TEMPERATURE: 98.3 F | WEIGHT: 224 LBS | DIASTOLIC BLOOD PRESSURE: 90 MMHG | SYSTOLIC BLOOD PRESSURE: 132 MMHG | BODY MASS INDEX: 33.95 KG/M2

## 2020-11-05 DIAGNOSIS — F17.200 TOBACCO USE DISORDER: ICD-10-CM

## 2020-11-05 DIAGNOSIS — I10 BENIGN ESSENTIAL HYPERTENSION: Primary | ICD-10-CM

## 2020-11-05 DIAGNOSIS — E07.9 THYROID MASS: ICD-10-CM

## 2020-11-05 DIAGNOSIS — Z71.6 ENCOUNTER FOR TOBACCO USE CESSATION COUNSELING: ICD-10-CM

## 2020-11-05 PROCEDURE — 255N000002 HC RX 255 OP 636: Performed by: RADIOLOGY

## 2020-11-05 PROCEDURE — 70491 CT SOFT TISSUE NECK W/DYE: CPT

## 2020-11-05 PROCEDURE — G0463 HOSPITAL OUTPT CLINIC VISIT: HCPCS | Mod: 25

## 2020-11-05 PROCEDURE — 99214 OFFICE O/P EST MOD 30 MIN: CPT | Performed by: FAMILY MEDICINE

## 2020-11-05 RX ORDER — LISINOPRIL 10 MG/1
10 TABLET ORAL DAILY
Qty: 30 TABLET | Refills: 1 | Status: SHIPPED | OUTPATIENT
Start: 2020-11-05 | End: 2020-12-07

## 2020-11-05 RX ORDER — IOPAMIDOL 612 MG/ML
100 INJECTION, SOLUTION INTRAVASCULAR ONCE
Status: COMPLETED | OUTPATIENT
Start: 2020-11-05 | End: 2020-11-05

## 2020-11-05 RX ADMIN — IOPAMIDOL 100 ML: 612 INJECTION, SOLUTION INTRAVENOUS at 08:32

## 2020-11-05 ASSESSMENT — ANXIETY QUESTIONNAIRES
IF YOU CHECKED OFF ANY PROBLEMS ON THIS QUESTIONNAIRE, HOW DIFFICULT HAVE THESE PROBLEMS MADE IT FOR YOU TO DO YOUR WORK, TAKE CARE OF THINGS AT HOME, OR GET ALONG WITH OTHER PEOPLE: NOT DIFFICULT AT ALL
7. FEELING AFRAID AS IF SOMETHING AWFUL MIGHT HAPPEN: NOT AT ALL
3. WORRYING TOO MUCH ABOUT DIFFERENT THINGS: NOT AT ALL
6. BECOMING EASILY ANNOYED OR IRRITABLE: NOT AT ALL
GAD7 TOTAL SCORE: 0
1. FEELING NERVOUS, ANXIOUS, OR ON EDGE: NOT AT ALL
4. TROUBLE RELAXING: NOT AT ALL
2. NOT BEING ABLE TO STOP OR CONTROL WORRYING: NOT AT ALL
5. BEING SO RESTLESS THAT IT IS HARD TO SIT STILL: NOT AT ALL

## 2020-11-05 ASSESSMENT — MIFFLIN-ST. JEOR: SCORE: 1765.56

## 2020-11-05 ASSESSMENT — PAIN SCALES - GENERAL: PAINLEVEL: NO PAIN (0)

## 2020-11-05 ASSESSMENT — PATIENT HEALTH QUESTIONNAIRE - PHQ9: SUM OF ALL RESPONSES TO PHQ QUESTIONS 1-9: 0

## 2020-11-05 NOTE — PATIENT INSTRUCTIONS
Start Lisinopril 10 mg daily.  Taken in the morning.  Work on lifestyle modifications - tobacco cessation, exercise, weight management, limit caffeine and salt.    Recheck BP and lab in 1 month.

## 2020-11-05 NOTE — NURSING NOTE
"Chief Complaint   Patient presents with     Hypertension       Initial BP (!) 132/90 (BP Location: Left arm, Patient Position: Chair, Cuff Size: Adult Large)   Pulse 82   Temp 98.3  F (36.8  C) (Tympanic)   Ht 1.727 m (5' 8\")   Wt 101.6 kg (224 lb)   SpO2 96%   BMI 34.06 kg/m   Estimated body mass index is 34.06 kg/m  as calculated from the following:    Height as of this encounter: 1.727 m (5' 8\").    Weight as of this encounter: 101.6 kg (224 lb).  Medication Reconciliation: complete  Shira Chung LPN  "

## 2020-11-05 NOTE — PROGRESS NOTES
"Subjective     Kevyn Henriquez is a 69 year old male who presents to clinic today for the following health issues:    HPI          Due for vaccines - shingrix, pneumovax.    Possible Hypertension       Do you check your blood pressure regularly outside of the clinic? No     Are you following a low salt diet? Yes    Are your blood pressures ever more than 140 on the top number (systolic) OR more   than 90 on the bottom number (diastolic), for example 140/90? No , not checking     144/112 on 10/20/20  169/123 on 10/7/20  Normal readings in between.  Does not have home monitor.     Dr Khan yesterday 144/100.  Consult regarding prior stroke.  Happy with visit - had questions answered.  Was told vision will likely improve over time.    Thyroid mass - CT done today - ordered by ENT - no LAD.    Hemithyroidectomy planned.    Quit smoking x 6 days - restarted with had stressful event -  issues at house.  Determined to quit.  Will try again.    Goal of learning 20 pounds.  Goal to be off BP medication.    Review of Systems   Constitutional, HEENT, cardiovascular, pulmonary, gi and gu systems are negative, except as otherwise noted.      Objective    BP (!) 132/90 (BP Location: Left arm, Patient Position: Chair, Cuff Size: Adult Large)   Pulse 82   Temp 98.3  F (36.8  C) (Tympanic)   Ht 1.727 m (5' 8\")   Wt 101.6 kg (224 lb)   SpO2 96%   BMI 34.06 kg/m    Body mass index is 34.06 kg/m .  Physical Exam   GENERAL: alert, no distress and over weight  NECK: no adenopathy, no asymmetry, masses, or scars and thyroid normal to palpation  RESP: lungs clear to auscultation - no rales, rhonchi or wheezes  CV: regular rate and rhythm, normal S1 S2, no S3 or S4, no murmur, click or rub, no peripheral edema and peripheral pulses strong  MS: no gross musculoskeletal defects noted, no edema  PSYCH: mentation appears normal, affect normal/bright          Assessment & Plan     Benign essential hypertension  Documented on more " "than 1 occasion.  Recent CVA.  Reviewed importance of secondary prevention.  Start Lisinopril.    Discussed lifestyle changes for risk reduction as well - diet, weight loss, exercise, limited caffeine, smoking cessation.  - lisinopril (ZESTRIL) 10 MG tablet; Take 1 tablet (10 mg) by mouth daily    Tobacco use disorder  Counseled on cessation.     Encounter for tobacco use cessation counseling  As above.       BMI:   Estimated body mass index is 34.06 kg/m  as calculated from the following:    Height as of this encounter: 1.727 m (5' 8\").    Weight as of this encounter: 101.6 kg (224 lb).   Weight management plan: Discussed healthy diet and exercise guidelines         Patient Instructions   Start Lisinopril 10 mg daily.  Taken in the morning.  Work on lifestyle modifications - tobacco cessation, exercise, weight management, limit caffeine and salt.    Recheck BP and lab in 1 month.      Return in about 1 month (around 12/5/2020) for BP Recheck.    Carmelina Pappas MD  [facility] [place]    "

## 2020-11-06 ASSESSMENT — ANXIETY QUESTIONNAIRES: GAD7 TOTAL SCORE: 0

## 2020-11-18 NOTE — PATIENT INSTRUCTIONS
1. You were seen in the ENT Clinic today by Dr. Morales.  If you have any questions or concerns after your appointment, please call   - Option 1: ENT Clinic: [phone]   - Option 2: Sharon (Dr. Morales's Nurse): [phone]  2.   Plan to return to clinic in 1 year by telephone    3. MRI and audio locally      Sharon Nation LPN  [facility] - Otolaryngology

## 2020-11-19 ENCOUNTER — PRE VISIT (OUTPATIENT)
Dept: OTOLARYNGOLOGY | Facility: CLINIC | Age: 70
End: 2020-11-19

## 2020-11-19 ENCOUNTER — VIRTUAL VISIT (OUTPATIENT)
Dept: OTOLARYNGOLOGY | Facility: CLINIC | Age: 70
End: 2020-11-19
Payer: COMMERCIAL

## 2020-11-19 VITALS — WEIGHT: 222 LBS | HEIGHT: 69 IN | BODY MASS INDEX: 32.88 KG/M2

## 2020-11-19 DIAGNOSIS — Z53.9 NO SHOW: Primary | ICD-10-CM

## 2020-11-19 DIAGNOSIS — D33.3 ACOUSTIC NEUROMA (H): Primary | ICD-10-CM

## 2020-11-19 PROCEDURE — 99203 OFFICE O/P NEW LOW 30 MIN: CPT | Mod: 95 | Performed by: OTOLARYNGOLOGY

## 2020-11-19 PROCEDURE — 99207 PR NO CHARGE LOS: CPT | Performed by: NEUROLOGICAL SURGERY

## 2020-11-19 ASSESSMENT — PAIN SCALES - GENERAL: PAINLEVEL: NO PAIN (0)

## 2020-11-19 ASSESSMENT — MIFFLIN-ST. JEOR: SCORE: 1772.37

## 2020-11-19 NOTE — LETTER
11/19/2020       RE: Kevyn Henriquez  [address] [address] [address]     Dear Colleague,    Thank you for referring your patient, Kevyn Henriquez, to the [facility]SE [facility]IC [place] at [facility]. Please see a copy of my visit note below.    I did not speak to this patient today. Please see Dr. Morales's note for details.  BENNY Butler MD      Again, thank you for allowing me to participate in the care of your patient.      Sincerely,    Eileen Butler MD

## 2020-11-19 NOTE — LETTER
11/19/2020       RE: Kevyn Henriquez  [address] [address] [address]     Dear Colleague,    Thank you for referring your patient, Kevyn Henriquez, to the [facility] EAR NOSE AND THROAT CLINIC [place] at [facility]. Please see a copy of my visit note below.    I did not speak to this patient today. Please see Dr. Morales's note for details.  BENNY Butler MD      Again, thank you for allowing me to participate in the care of your patient.      Sincerely,    Eileen Butler MD

## 2020-11-19 NOTE — LETTER
"11/19/2020       RE: Kevyn Henriquez  [address] [address] [address]     Dear Colleague,    Thank you for referring your patient, Kevyn Henriquez, to the [facility] EAR NOSE AND THROAT CLINIC [place] at [facility]. Please see a copy of my visit note below.    Kevyn Henriquez is a 69 year old male who is being evaluated via a billable telephone visit.      The patient has been notified of following:     \"This telephone visit will be conducted via a call between you and your physician/provider. We have found that certain health care needs can be provided without the need for a physical exam.  This service lets us provide the care you need with a short phone conversation.  If a prescription is necessary we can send it directly to your pharmacy.  If lab work is needed we can place an order for that and you can then stop by our lab to have the test done at a later time.    Telephone visits are billed at different rates depending on your insurance coverage. During this emergency period, for some insurers they may be billed the same as an in-person visit.  Please reach out to your insurance provider with any questions.    If during the course of the call the physician/provider feels a telephone visit is not appropriate, you will not be charged for this service.\"    Patient has given verbal consent for Telephone visit?  Yes    What phone number would you like to be contacted at? [phone]    How would you like to obtain your AVS? Mail a copy    Phone call duration: 30 minutes    I had the pleasure of meeting Kevyn Henriquez in consultation today at the [facility] at the request of Dr. Orona.    HISTORY OF PRESENT ILLNESS: Mr. Henriquez is a 69 year old male who presents for discussion regarding a right intracanalicular vestibular schwannoma. The patient is a retired  and reports considerable occupational noise " exposure. He reports that he first noticed hearing loss in the mid to late 1980's. 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 This worsened considerably in 2005 and has progressively worsened since this time. He reports brief dysequilibrium with rapid head movements, but otherwise denies issues with balance. He denies tinnitus. He denies facial weakness, numbness/tingling, twitcing, dysgeusia, corneal dryness. He reports infrequent headache.    The patient underwent an MRI on 10/7/2020 for an acute headache which incidentally revealed a right contrast-enhancing internal auditory canal lesion most consistent with a vestibular schwannoma.    Past Medical History:   Diagnosis Date     Tobacco abuse        Past Surgical History:   Procedure Laterality Date     COLONOSCOPY N/A 6/25/2018    Procedure: COLONOSCOPY;  COLONOSCOPY WITH  POLYPECTOMY;  Surgeon: Marino Arzola MD;  Location: [facility]     EXCISE GANGLION WRIST Left 4/30/2015    Procedure: EXCISE GANGLION WRIST;  Surgeon: Phyllis Tuttle MD;  Location: [facility]     EXCISE MASS NECK N/A 4/30/2015    Procedure: EXCISE MASS NECK;  Surgeon: Phyllis Tuttle MD;  Location: [facility] OR     HC EXCIS PRIMARY GANGLION WRIST      left wrist       No Known Allergies    Current Outpatient Medications   Medication     aspirin (ASA) 325 MG tablet     atorvastatin (LIPITOR) 40 MG tablet     lisinopril (ZESTRIL) 10 MG tablet     UNABLE TO FIND     No current facility-administered medications for this visit.        SOCIAL HISTORY:   Social History     Tobacco Use     Smoking status: Light Tobacco Smoker     Types: Cigarettes     Smokeless tobacco: Never Used     Tobacco comment: 1 cigs daily  10/22/20   Substance Use Topics     Alcohol use: No     Drug use: No         FAMILY HISTORY:   Family History   Problem Relation Age of Onset     Diabetes Mother         borderline diabetes     Heart Disease Father       Hypertension Father      Hypertension Brother      Hypertension Brother         REVIEW OF SYSTEMS:  Patient Supplied Answers to Review of Systems   ROS: 10 point ROS neg other than the symptoms noted above in the HPI.    Audiogram: 11/19/2020    Right mild downsloping to profound sensorineural hearing loss, left normal/mild downsloping to severe sensorineural hearing loss.  SRT right: 80 dB left: 30 dB   WR right: 20% at 100 dB left: 100% at 70 dB   Acoustic Reflexes: Left ipsi present. Absent in all other conditions.  Tympanograms: type A right, type A left     Imaging  PROCEDURE: MR BRAIN W/O & W CONTRAST 10/7/2020 4:57 PM                                                                   Radiologist's impression: Acute right cerebellar tonsillar infarct. The [facility]  emergency room was notified and the patient was transferred from the  scanner to the emergency room. 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    Imaging independently reviewed by Dr. Jovana Morales. An MRI was performed which demonstrates a mass in the right internal auditory canal that measures 7mm. There is no contact with the cerebellar peduncle and there is no hydrocephalus.    Assessment and plan:  Kevyn Henriquez is a 69 year old male who presents with a right intracanalicular lesion most consistent with a vestibular schwannoma and right Class D hearing.    We reviewed the diagnosis in detail along with the expected natural history of the lesion, confirming that it is the cause of his hearing loss and imbalance. We expect additional hearing loss with time with the tumor present. We also reviewed the treatment options and all risks and benefits of each were discussed, including observation, stereotactic radiosurgery, and microsurgical resection. Kevyn Henriquez has all options available.      It is a reasonable option to obtain another scan in one year and continue watchful waiting.  We would get a scan before one year if he developed  worsening neurologic symptoms which we reviewed in detail.     We reviewed the goal of radiosurgery being to stop tumor growth rather than to remove the lesion. We reviewed what this outpatient procedure entails. Success rates are in the 80% range or higher for a tumor of this size, and those with growth do not necessarily go on to have surgery if the tumor remains relatively small, but surgery can be necessary for continued growth. The risks and side effects of radiosurgery are typically delayed in onset but include facial paralysis, dizziness, hearing loss, and risk to surrounding structures. There is also a small risk of malignant degeneration.     Kevyn Henriquez is a candidate for microsurgical excision. Given the size of the tumor and his nonservicable hearing this is likely not the most prudent treatment modality at this time.     Kevyn Henriquez asked insightful questions and indicated that he would like to pursue observation at this time. He will schedule follow up with Dr. Celine Orona in 12 months for repeat MRI and audio. We would like to see him via a virtual visit after to discuss the results of his repeat evaluation. The patient understands that he should seek prompt evaluation if he experiences worsening or new symptoms including sudden hearing loss, dizziness, facial numbness or twitching or weakness, uncontrolled headache, or changes in vision.  The patient expressed understanding and is in agreement with this plan.  All questions were answered.    Merrick Hogan MD  Neurotology Fellow  Department of Otolaryngology - Head and Neck Surgery  [facility]    I, Jovana Morales MD, saw this patient with the resident/fellow and agree with the resident/fellow s findings and plan of care as documented in the resident s/fellow s note.    Jovana Morales MD    I spent a total of 30 minutes face-to-face with Kevyn Henriquez during today s office visit. Over 50% of this time was spent  counseling the patient and/or coordinating care regarding his right vestibular schwannoma .          Again, thank you for allowing me to participate in the care of your patient.      Sincerely,    Jovana Morales MD

## 2020-11-19 NOTE — PROGRESS NOTES
I did not speak to this patient today. Please see Dr. Morales's note for details.  BENNY Butler MD

## 2020-11-19 NOTE — PROGRESS NOTES
"Kevyn Henriquez is a 69 year old male who is being evaluated via a billable telephone visit.      The patient has been notified of following:     \"This telephone visit will be conducted via a call between you and your physician/provider. We have found that certain health care needs can be provided without the need for a physical exam.  This service lets us provide the care you need with a short phone conversation.  If a prescription is necessary we can send it directly to your pharmacy.  If lab work is needed we can place an order for that and you can then stop by our lab to have the test done at a later time.    Telephone visits are billed at different rates depending on your insurance coverage. During this emergency period, for some insurers they may be billed the same as an in-person visit.  Please reach out to your insurance provider with any questions.    If during the course of the call the physician/provider feels a telephone visit is not appropriate, you will not be charged for this service.\"    Patient has given verbal consent for Telephone visit?  Yes    What phone number would you like to be contacted at? [phone]    How would you like to obtain your AVS? Mail a copy    Phone call duration: 30 minutes    I had the pleasure of meeting Kevyn Henriquez in consultation today at the [facility] at the request of Dr. Orona.    HISTORY OF PRESENT ILLNESS: Mr. Henriquez is a 69 year old male who presents for discussion regarding a right intracanalicular vestibular schwannoma. The patient is a retired  and reports considerable occupational noise exposure. He reports that he first noticed hearing loss in the mid to late 1980's. 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 This worsened considerably in 2005 and has progressively worsened since this time. He reports brief dysequilibrium with " rapid head movements, but otherwise denies issues with balance. He denies tinnitus. He denies facial weakness, numbness/tingling, twitcing, dysgeusia, corneal dryness. He reports infrequent headache.    The patient underwent an MRI on 10/7/2020 for an acute headache which incidentally revealed a right contrast-enhancing internal auditory canal lesion most consistent with a vestibular schwannoma.    Past Medical History:   Diagnosis Date     Tobacco abuse        Past Surgical History:   Procedure Laterality Date     COLONOSCOPY N/A 6/25/2018    Procedure: COLONOSCOPY;  COLONOSCOPY WITH  POLYPECTOMY;  Surgeon: Marino Arzola MD;  Location: [facility]     EXCISE GANGLION WRIST Left 4/30/2015    Procedure: EXCISE GANGLION WRIST;  Surgeon: Phyllis Tuttle MD;  Location: [facility]     EXCISE MASS NECK N/A 4/30/2015    Procedure: EXCISE MASS NECK;  Surgeon: Phyllis Tuttle MD;  Location: [facility]     HC EXCIS PRIMARY GANGLION WRIST      left wrist       No Known Allergies    Current Outpatient Medications   Medication     aspirin (ASA) 325 MG tablet     atorvastatin (LIPITOR) 40 MG tablet     lisinopril (ZESTRIL) 10 MG tablet     UNABLE TO FIND     No current facility-administered medications for this visit.        SOCIAL HISTORY:   Social History     Tobacco Use     Smoking status: Light Tobacco Smoker     Types: Cigarettes     Smokeless tobacco: Never Used     Tobacco comment: 1 cigs daily  10/22/20   Substance Use Topics     Alcohol use: No     Drug use: No         FAMILY HISTORY:   Family History   Problem Relation Age of Onset     Diabetes Mother         borderline diabetes     Heart Disease Father      Hypertension Father      Hypertension Brother      Hypertension Brother         REVIEW OF SYSTEMS:  Patient Supplied Answers to Review of Systems   ROS: 10 point ROS neg other than the symptoms noted above in the HPI.    Audiogram: 11/19/2020    Right mild downsloping to profound sensorineural hearing loss, left  normal/mild downsloping to severe sensorineural hearing loss.  SRT right: 80 dB left: 30 dB   WR right: 20% at 100 dB left: 100% at 70 dB   Acoustic Reflexes: Left ipsi present. Absent in all other conditions.  Tympanograms: type A right, type A left     Imaging  PROCEDURE: MR BRAIN W/O & W CONTRAST 10/7/2020 4:57 PM                                                                   Radiologist's impression: Acute right cerebellar tonsillar infarct. The [place]  emergency room was notified and the patient was transferred from the  scanner to the emergency room. 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    Imaging independently reviewed by Dr. Jovaan Morales. An MRI was performed which demonstrates a mass in the right internal auditory canal that measures 7mm. There is no contact with the cerebellar peduncle and there is no hydrocephalus.    Assessment and plan:  Kevyn Henriquez is a 69 year old male who presents with a right intracanalicular lesion most consistent with a vestibular schwannoma and right Class D hearing.    We reviewed the diagnosis in detail along with the expected natural history of the lesion, confirming that it is the cause of his hearing loss and imbalance. We expect additional hearing loss with time with the tumor present. We also reviewed the treatment options and all risks and benefits of each were discussed, including observation, stereotactic radiosurgery, and microsurgical resection. Kevyn Henriquez has all options available.      It is a reasonable option to obtain another scan in one year and continue watchful waiting.  We would get a scan before one year if he developed worsening neurologic symptoms which we reviewed in detail.     We reviewed the goal of radiosurgery being to stop tumor growth rather than to remove the lesion. We reviewed what this outpatient procedure entails. Success rates are in the 80% range or higher for a tumor of this size, and those with growth do not  necessarily go on to have surgery if the tumor remains relatively small, but surgery can be necessary for continued growth. The risks and side effects of radiosurgery are typically delayed in onset but include facial paralysis, dizziness, hearing loss, and risk to surrounding structures. There is also a small risk of malignant degeneration.     Kevyn Henriquez is a candidate for microsurgical excision. Given the size of the tumor and his nonservicable hearing this is likely not the most prudent treatment modality at this time.     Kevyn Henriquez asked insightful questions and indicated that he would like to pursue observation at this time. He will schedule follow up with Dr. Celine Orona in 12 months for repeat MRI and audio. We would like to see him via a virtual visit after to discuss the results of his repeat evaluation. The patient understands that he should seek prompt evaluation if he experiences worsening or new symptoms including sudden hearing loss, dizziness, facial numbness or twitching or weakness, uncontrolled headache, or changes in vision.  The patient expressed understanding and is in agreement with this plan.  All questions were answered.    Merrick Hogan MD  Neurotology Fellow  Department of Otolaryngology - Head and Neck Surgery  [facility]    I, Jovana Morales MD, saw this patient with the resident/fellow and agree with the resident/fellow s findings and plan of care as documented in the resident s/fellow s note.    Jovana Morales MD    I spent a total of 30 minutes face-to-face with Kevyn Henriquez during today s office visit. Over 50% of this time was spent counseling the patient and/or coordinating care regarding his right vestibular schwannoma .

## 2020-12-01 NOTE — PROGRESS NOTES
"Subjective     Kevyn Henriquez is a 70 year old male who presents to clinic today for the following health issues:    Due for pneumonia, shingles vaccines- Patient Declined    HPI      Blood Pressure Follow-up    Do you check your blood pressure regularly outside of the clinic? No     Are you following a low salt diet? Yes    Are your blood pressures ever more than 140 on the top number (systolic) OR more   than 90 on the bottom number (diastolic), for example 140/90? Yes- diastolic 90 noted at office visit 11/5/20    Lisinopril started 11/5/2020 10 mg.  Dry cough - annoying at bedtime.  Keeps water present by bedside.  Sporadic.  Minimal.    Residual vision changes since stroke - frustrating.  Affects activities- puzzles - papers/reading.    Ongoing tobacco use - Pt stated he is ready to quit but declined counseling.        How many servings of fruits and vegetables do you eat daily?  2-3    On average, how many sweetened beverages do you drink each day (Examples: soda, juice, sweet tea, etc.  Do NOT count diet or artificially sweetened beverages)?   2-daily orange & cranberry juice; un-sweetened per pt    How many days per week do you exercise enough to make your heart beat faster? 7    How many minutes a day do you exercise enough to make your heart beat faster? 60 or more    How many days per week do you miss taking your medication? 0    Review of Systems   Constitutional, HEENT, cardiovascular, pulmonary, gi and gu systems are negative, except as otherwise noted.      Objective    /84 (BP Location: Right arm, Patient Position: Sitting, Cuff Size: Adult Regular)   Pulse 74   Temp 96.8  F (36  C) (Tympanic)   Ht 1.727 m (5' 8\")   Wt 102.1 kg (225 lb)   SpO2 96%   BMI 34.21 kg/m    Body mass index is 34.21 kg/m .  Physical Exam   GENERAL: alert, no distress and over weight  NECK: no adenopathy, no asymmetry, masses, or scars and thyroid normal to palpation  RESP: lungs clear to auscultation - no " rales, rhonchi or wheezes  CV: regular rate and rhythm, normal S1 S2, no S3 or S4, no murmur, click or rub, no peripheral edema and peripheral pulses strong  MS: no gross musculoskeletal defects noted, no edema  PSYCH: mentation appears normal, affect normal/bright      BMP today.    Assessment & Plan     Essential hypertension  Improved, but not at goal.  Would like <130/80 consistently.  Increase lisinopril to 20 mg daily.  Script for BP cuff to do home readings.  Follow up 2 months.  - Basic metabolic panel  - Home Blood Pressure Monitor Order    Tobacco use disorder  Reduction efforts in place.    Encounter for tobacco use cessation counseling  Declines assistance    Benign essential hypertension  As above  - lisinopril (ZESTRIL) 20 MG tablet; Take 1 tablet (20 mg) by mouth daily        See Patient Instructions    Return in about 2 months (around 2/7/2021).    Carmelina Pappas MD  [facility] [place]    DME (Durable Medical Equipment) Orders and Documentation  Orders Placed This Encounter   Procedures     Home Blood Pressure Monitor Order      The patient was assessed and it was determined the patient is in need of the following listed DME Supplies/Equipment. Please complete supporting documentation below to demonstrate medical necessity.      DME All Other Item(s) Documentation    List reason for need and supporting documentation for medical necessity below for each DME item.     1. BP cuff - diagnosis hypertension and history of CVA.

## 2020-12-07 ENCOUNTER — OFFICE VISIT (OUTPATIENT)
Dept: FAMILY MEDICINE | Facility: OTHER | Age: 70
End: 2020-12-07
Attending: FAMILY MEDICINE
Payer: COMMERCIAL

## 2020-12-07 VITALS
DIASTOLIC BLOOD PRESSURE: 82 MMHG | TEMPERATURE: 96.8 F | BODY MASS INDEX: 34.1 KG/M2 | OXYGEN SATURATION: 96 % | HEART RATE: 74 BPM | WEIGHT: 225 LBS | SYSTOLIC BLOOD PRESSURE: 130 MMHG | HEIGHT: 68 IN

## 2020-12-07 DIAGNOSIS — F17.200 TOBACCO USE DISORDER: ICD-10-CM

## 2020-12-07 DIAGNOSIS — I10 ESSENTIAL HYPERTENSION: Primary | ICD-10-CM

## 2020-12-07 DIAGNOSIS — I10 BENIGN ESSENTIAL HYPERTENSION: ICD-10-CM

## 2020-12-07 DIAGNOSIS — Z71.6 ENCOUNTER FOR TOBACCO USE CESSATION COUNSELING: ICD-10-CM

## 2020-12-07 LAB
ANION GAP SERPL CALCULATED.3IONS-SCNC: 2 MMOL/L (ref 3–14)
BUN SERPL-MCNC: 19 MG/DL (ref 7–30)
CALCIUM SERPL-MCNC: 9.2 MG/DL (ref 8.5–10.1)
CHLORIDE SERPL-SCNC: 108 MMOL/L (ref 94–109)
CO2 SERPL-SCNC: 29 MMOL/L (ref 20–32)
CREAT SERPL-MCNC: 0.71 MG/DL (ref 0.66–1.25)
GFR SERPL CREATININE-BSD FRML MDRD: >90 ML/MIN/{1.73_M2}
GLUCOSE SERPL-MCNC: 103 MG/DL (ref 70–99)
POTASSIUM SERPL-SCNC: 4.8 MMOL/L (ref 3.4–5.3)
SODIUM SERPL-SCNC: 139 MMOL/L (ref 133–144)

## 2020-12-07 PROCEDURE — 36415 COLL VENOUS BLD VENIPUNCTURE: CPT | Mod: ZL | Performed by: FAMILY MEDICINE

## 2020-12-07 PROCEDURE — 99213 OFFICE O/P EST LOW 20 MIN: CPT | Performed by: FAMILY MEDICINE

## 2020-12-07 PROCEDURE — G0463 HOSPITAL OUTPT CLINIC VISIT: HCPCS

## 2020-12-07 PROCEDURE — 80048 BASIC METABOLIC PNL TOTAL CA: CPT | Mod: ZL | Performed by: FAMILY MEDICINE

## 2020-12-07 RX ORDER — LISINOPRIL 20 MG/1
20 TABLET ORAL DAILY
Qty: 30 TABLET | Refills: 1 | Status: SHIPPED | OUTPATIENT
Start: 2020-12-07

## 2020-12-07 ASSESSMENT — PAIN SCALES - GENERAL: PAINLEVEL: MILD PAIN (2)

## 2020-12-07 ASSESSMENT — MIFFLIN-ST. JEOR: SCORE: 1765.09

## 2020-12-07 NOTE — NURSING NOTE
"Chief Complaint   Patient presents with     Hypertension     follow-up       Initial /84 (BP Location: Right arm, Patient Position: Sitting, Cuff Size: Adult Regular)   Pulse 74   Temp 96.8  F (36  C) (Tympanic)   Ht 1.727 m (5' 8\")   Wt 102.1 kg (225 lb)   SpO2 96%   BMI 34.21 kg/m   Estimated body mass index is 34.21 kg/m  as calculated from the following:    Height as of this encounter: 1.727 m (5' 8\").    Weight as of this encounter: 102.1 kg (225 lb).  Medication Reconciliation: complete  Soco Luna LPN  "

## 2020-12-07 NOTE — PATIENT INSTRUCTIONS
Increase Lisinopril to 20 mg daily.  Recheck 2 months.    BP cuff faxed.  Sphingomanometer.    Lab today - will call with results.      Patient Education     What Is High Blood Pressure?  High blood pressure (hypertension) is known as the  silent killer.  This is because most of the time it doesn t cause symptoms. In fact, many people don t know they have it until other problems develop. In most cases, high blood pressure often requires lifelong treatment.   Understanding blood pressure  The circulatory system is made up of the heart and blood vessels that carry blood through the body. Your heart is the pump for this system. With each heartbeat (contraction), the heart sends blood out through large blood vessels called arteries. Blood pressure is a measure of how hard the moving blood pushes against the walls of the arteries.   High blood pressure can harm your health   In a healthy blood vessel, the blood moves smoothly through the vessel and puts normal pressure on the vessel walls.     High blood pressure occurs when blood pushes too hard against artery walls. This causes damage to the artery walls and then the formation of scar tissue as it heals. This makes the arteries stiff and weak. Plaque sticks to the scarred tissue narrowing and hardening the arteries. High blood pressure also causes your heart to work harder to get blood out to the body. High blood pressure raises your risk of heart attack, also known as acute myocardial infarction, or AMI, heart failure, and stroke. It can also lead to kidney disease, and blindness. In general, if you have high blood pressure, keeping your blood pressure below 130/80 mmHg may help prevent these problems. Your healthcare provider may prescribe medicine to help control blood pressure if lifestyle changes are not enough.   It's important to know your blood pressure numbers. Blood pressure measurements are given as 2 numbers. Systolic blood pressure is the upper number.  This is the pressure when the heart contracts. Diastolic blood pressure is the lower number. This is the pressure when the heart relaxes between beats.   Blood pressure is categorized as normal, elevated, or stage 1 or stage 2 high blood pressure:     Normal blood pressure is systolic of less than 120 and diastolic of less than 80 (120/80)    Elevated blood pressure is systolic of 120 to 129 and diastolic less than 80    Stage 1 high blood pressure is systolic is 130 to 139 or diastolic between 80 to 89    Stage 2 high blood pressure is when systolic is 140 or higher or the diastolic is 90 or higher  High blood pressure is diagnosed when multiple, separate readings show blood pressure above 130/80 mmHg. Talk with your healthcare provider if you have questions or concerns about your blood pressure readings.   Measuring blood pressure   An example of a blood pressure measurement is 120/70. The top number is the pressure of blood against the artery walls during a heartbeat (systolic). The bottom number is the pressure of blood against artery walls between heartbeats (diastolic). Talk with your healthcare provider to find out what your blood pressure goals should be.    Controlling blood pressure  If your blood pressure is too high, work with your doctor on a plan for lowering it. Below are steps you can take that will help lower your blood pressure.     Choose heart-healthy foods. Eating healthier meals helps you control your blood pressure. Ask your doctor about the DASH eating plan. This plan helps reduce blood pressure by limiting the amount of sodium (salt) you have in your diet. DASH also encourages eating plenty of fruits and vegetables, low-fat or non-fat dairy, whole-grains, and foods high in fiber, and low in fat. This also provides an enhanced amount of potassium which can also help lower blood pressure.    Reduce sodium. Reducing sodium in your diet reduces fluid retention. Fluid retention caused by too much  "salt increases blood volume and blood pressure. The American Heart Association (AHA) advises an \"ideal\" amount of sodium: no more than 1,500 mg a day.  But because Americans eat so much salt, the AHA says a positive change can occur by cutting back to even 2,300 mg a day.    Stay at a healthy weight. Being overweight makes you more likely to have high blood pressure. Losing excess weight helps lower blood pressure.    Exercise regularly. Daily exercise helps your heart and blood vessels work better and stay healthier. It can help lower your blood pressure.    Stop smoking. Smoking increases blood pressure and damages blood vessels.    Limit alcohol. Drinking too much alcohol can raise blood pressure. Men should have no more than 2 drinks a day. Women should have no more than 1. A drink is equal to 1 beer, or a small glass of wine, or a shot of liquor.    Control stress. Stress makes your heart work harder and beat faster. Controlling stress helps you control your blood pressure.  Facts about high blood pressure     Feeling OK does not mean your blood pressure is under control.  Likewise, feeling bad doesn t mean it s out of control. The only way to know for sure is to check your pressure regularly.    Medicine is only one part of controlling high blood pressure. You also need to manage your weight, get regular exercise, and adjust your eating habits.    High blood pressure is usually a lifelong problem. But it can be controlled with healthy lifestyle changes and medicine.    Hypertension is not the same as stress. Although stress may be a factor in high blood pressure, it s only one part of the story.    Blood pressure medicines need to be taken every day. Stopping suddenly may cause a dangerous increase in pressure.    [employer] last reviewed this educational content on 4/1/2019 2000-2020 [employer]. [address] [place] [address], [place] [address]. All rights reserved. This information is not intended as " a substitute for professional medical care. Always follow your healthcare professional's instructions.

## 2020-12-08 NOTE — TELEPHONE ENCOUNTER
APPT NOTES: E04.1 (ICD-10-CM) - Thyroid nodule, KENDELL RIVERA, per pt, VERIFY [phone]   APPT DATE: 12.21.20    NOTES (FOR ALL VISITS) STATUS DETAILS   OFFICE NOTES from referring provider Internal   KENDELL RIVERA   OFFICE NOTES from other specialist Internal  10.8.20 Olga CURRY     ED NOTES na    OPERATIVE REPORT  (thyroid, pituitary, adrenal, parathyroid)  (All op notes related to diagnoses) na    MEDICATION LIST Internal     IMAGING      DEXASCAN    (ALL) na    MRI (BRAIN)    (ALL) Internal  10.7.20,    XR (Chest)    (ALL) na    CT (HEAD/NECK/CHEST/ABDOMEN)    (ALL) Internal  11.5.20, 10.7.20,    NUCLEAR    (ALL)  na    ULTRASOUND (HEAD/NECK) FNA BIOPSIES    (ALL) These images have pathology reports that need to be collected Internal  10.20.20    ULTRASOUND (HEAD/NECK)    (ALL) Internal  10.14.20    LABS     DIABETES: HBGA1C, CREATININE, FASTING LIPIDS, MICROALBUMIN URINE, POTASSIUM, TSH, T4    THYROID: TSH, T4, CBC, THYRODLONULIN, TOTAL T3, FREE T4, CALCITONIN, CEA Internal  Cbc- 10.7.20   LIPIDS 10.7.20   TSH/T4- 10.8.20      PATHOLOGY REPORTS WITH CASE NUMBER  *All pathology reports, list case number related to DX  *Just report, no path slides  *Surgical path reports for endocrine organs (ovaries, testes, pancreas, etc) NA

## 2020-12-18 NOTE — PROGRESS NOTES
"Kevyn Henriquez is a 70 year old male who is being evaluated via a billable telephone visit.      The patient has been notified of following:     \"This telephone visit will be conducted via a call between you and your physician/provider. We have found that certain health care needs can be provided without the need for a physical exam.  This service lets us provide the care you need with a short phone conversation.  If a prescription is necessary we can send it directly to your pharmacy.  If lab work is needed we can place an order for that and you can then stop by our lab to have the test done at a later time.    Telephone visits are billed at different rates depending on your insurance coverage. During this emergency period, for some insurers they may be billed the same as an in-person visit.  Please reach out to your insurance provider with any questions.    If during the course of the call the physician/provider feels a telephone visit is not appropriate, you will not be charged for this service.\"    Patient has given verbal consent for Telephone visit?  Yes    What phone number would you like to be contacted at? [phone]    How would you like to obtain your AVS? Mail a copy    Alicia Waterman MA      "

## 2020-12-21 ENCOUNTER — VIRTUAL VISIT (OUTPATIENT)
Dept: ENDOCRINOLOGY | Facility: CLINIC | Age: 70
End: 2020-12-21
Attending: OTOLARYNGOLOGY
Payer: COMMERCIAL

## 2020-12-21 ENCOUNTER — PRE VISIT (OUTPATIENT)
Dept: ENDOCRINOLOGY | Facility: CLINIC | Age: 70
End: 2020-12-21

## 2020-12-21 DIAGNOSIS — E07.9 THYROID MASS: Primary | ICD-10-CM

## 2020-12-21 PROCEDURE — 99204 OFFICE O/P NEW MOD 45 MIN: CPT | Mod: 95

## 2020-12-21 NOTE — PROGRESS NOTES
Endocrine Consult Video visit note-     Attending Assessment/Plan :   Left thyroid nodule 4.4 cm - benign cytology. It causes some tracheal deviation. It appears to be asymptomatic.  I have counseled him on expected < 7% false negative rate.  He is already scheduled for follow up US in 2/2021-- this is very soon .    Labs to include calcitonin to prove it normal    Iodine contrast exposure 11/5/2020 CT with contrast - in susceptible individuals this could cause thyroid dysfunciton.    HTN - new diagnosis .       Due to the COVID 19 pandemic this visit was a telephone /video visit in order to help prevent spread of infection in this high risk patient and the general population. The patient gave verbal consent for the visit today.    Start time doximity 1500  Stop time 1525  Total time 25 minutes    Chief complaint:  Kevyn is a 70 year old male seen in consultation at the request of Dr Celine Orona for thyroid nodule.  I have reviewed Care Everywhere including  [facility] lab reports, imaging reports and provider notes as indicated.      HISTORY OF PRESENT ILLNESS  Kevyn had never been told of goiter or thyroid concern prior to the incidental finding on imaging in 10/2020.    Left thyroid nodule was first noted on MRI C spine 10/7/2020. This was followed by US and FNAB and also CT neck.  That day (10/7/2020) he had presented to the [place] ED due to abnormal finding on brain MRI showing acute right cerebellar tonsillar infarct.  He reported 3 weeks of visual symptoms.    He has never taken thyroid medication.  He can't recall childhood radiation exposures.  There is no family history of thyroid disease other than a second cousin who had surgery during childhood. There is no known family history of thyroid cancer.     We have the following labs:   8/14/15 TSH 1.08  10/8/2020 TSH 1.04  12/7/2020 : Ca 9.2, creatinine 0.71, glucose 103    I have reviewed images on pACS  10/7/2020 MRI C spine - larget left thyroid  mass 4.8 cm tall   10/14/2020 thyroid US:   Large nodule fills/ expands left lobe 4.3 x 4 x 4.4 cm ; thin halo.  Grade 2-3 blood flow;  FNAB 10/20/2020: benign  11/5/2020 CT neck with contrast asymmetrical thyroid with large mass left causing some deviation of trachea to the right. 4.4 x 4.1 x     REVIEW OF SYSTEMS  Weight is up 5-6 lbs since the vision went  No swallow symptoms  No voice symptoms  1.5 weeks of noticing it - some discomfort - once he took a single tylenol for the discomfort   Sleeps on side; no head position symptoms that choke  No choking with hands over head  Don't hear well  Had bulging disc in the neck - he is careful with the pillow due to this  Vision is still unclear since the stroke - getting better slowly   Cardiac: negative; coughs with really cold air - weather;   Respiratory: negative  GI: negative  Memory is stable  No weakness  In 2015 he had sebaceous cyst on the back on the neck  10 system ROS otherwise as per the HPI or negative    Past Medical History  Past Medical History:   Diagnosis Date     Acute recurrent maxillary sinusitis 09/2020     Cerebellar infarct (H) 10/07/2020     Colon polyps 2018     Thyroid nodule 10/2020     Tobacco abuse      Cervical disc    Past Surgical History:   Procedure Laterality Date     COLONOSCOPY N/A 6/25/2018    Procedure: COLONOSCOPY;  COLONOSCOPY WITH  POLYPECTOMY;  Surgeon: Marino Arzola MD;  [facility]: [facility]     EXCISE GANGLION WRIST Left 4/30/2015    Procedure: EXCISE GANGLION WRIST;  Surgeon: Phyllis Tuttle MD;  Location: [facility]     EXCISE MASS NECK N/A 4/30/2015    Procedure: EXCISE MASS NECK;  Surgeon: Phyllis Tuttle MD;  Location: [facility]     HC EXCIS PRIMARY GANGLION WRIST      left wrist       Medications  Current Outpatient Medications   Medication Sig Dispense Refill     aspirin (ASA) 325 MG tablet Take 325 mg by mouth daily       atorvastatin (LIPITOR) 40 MG tablet Take 1 tablet (40 mg) by mouth daily 90 tablet 3      "lisinopril (ZESTRIL) 20 MG tablet Take 1 tablet (20 mg) by mouth daily 30 tablet 1     UNABLE TO FIND 1 tablet 2 times daily preservision with vitamins       Allergies  No Known Allergies    Family History  family history includes Diabetes in his mother; Heart Disease in his father; Hypertension in his brother, brother, and father.    Social History  Social History     Tobacco Use     Smoking status: Light Tobacco Smoker     Types: Cigarettes     Smokeless tobacco: Never Used     Tobacco comment: 1 cigs daily  10/22/20   Substance Use Topics     Alcohol use: No     Drug use: No     Walks 2 dogs every day at least once for 1.5-2 miles; 4-5 cigarettes I nthe day; likes to putter , read, computer and can't do any of that stuff due to his vision.     Physical Exam  There were no vitals taken for this visit.  There is no height or weight on file to calculate BMI.   BP Readings from Last 1 Encounters:   12/07/20 130/82      Pulse Readings from Last 1 Encounters:   12/07/20 74      Resp Readings from Last 1 Encounters:   10/20/20 16      Temp Readings from Last 1 Encounters:   12/07/20 96.8  F (36  C) (Tympanic)      SpO2 Readings from Last 1 Encounters:   12/07/20 96%      Wt Readings from Last 1 Encounters:   12/07/20 102.1 kg (225 lb)      Ht Readings from Last 1 Encounters:   12/07/20 1.727 m (5' 8\")     GENERAL:pleasant man in  no distress; initially poor connection with lots of movement ; he moved outside which helped audio and video;   SKIN: Visible skin clear. No significant rash, abnormal pigmentation or lesions.  EYES: Eyes grossly normal to inspection.  No discharge or erythema, or obvious scleral/conjunctival abnormalities.  NECK I do not see the left thyroid mass even when trying to with his head hyperextended.   RESP: No audible wheeze, cough, or visible cyanosis.  No visible retractions or increased work of breathing.    NEURO: Cranial nerves grossly intact.  Awake, alert, responds appropriately to " questions.  Mentation and speech fluent.  PSYCH:affect normal, judgement and insight intact, and appearance well-groomed.    DATA REVIEW    Patient Name: REBECCA TOMPKINS   MR#: [id number]   Specimen #: [id number]   Collected: 10/20/2020   Received: 10/20/2020   Reported: 10/21/2020 11:22   Ordering Phy(s): MAGALYS XIE   Additional Phy(s): MARIA GUADALUPE OLPEZ   For improved result formatting, select 'View Enhanced Report Format' under    Linked Documents section.   SPECIMEN/STAIN PROCESS:   Thyroid, # , ultrasound guided fine needle aspiration, left nodule  Diff Quick Stain-cyto x 8, Pap-Cyto x 8, Cell Block w/ H&E-Cyto x 1   -------------------------------------------------------------   CYTOLOGIC INTERPRETATION:   Thyroid, # , ultrasound guided fine needle aspiration, left nodule:   Benign   Consistent with a benign nodule (includes adenomatoid nodule, colloid nodule, etc.)   The [place] implied risk of malignancy and recommended clinical  management:   Benign has a 0-3% risk of malignancy, recommended management is clinical  follow-up   Specimen Adequacy: Satisfactory for evaluation.   Electronically signed out by:   Hernandez Rosas M.D.     CLINICAL HISTORY:   MICROSCOPIC:   Smears and a cell block are examined.     CPT Codes:   A: 00319-ULAO, 79889-KCV, HCB     COLLECTION SITE:   Client:  [facility]   Location:  [facility] ()     The technical component of this testing was completed at the [facility], with the   professional component performed at the [facility], [address] [place] [address]   ([phone])       Patient Name: REBECCA TOMPKINS   MR#: [id number]   Specimen #:    Collected: 4/30/2015   Received: 4/30/2015   Reported: 5/4/2015 18:01   Ordering Phy(s): CAROLYN FLORES   Additional Phy(s): MAGALYS XIE               SPECIMEN(S):   Ganglion cyst left wrist     FINAL DIAGNOSIS:   Soft tissue, left wrist, excision    -Ganglion cyst     Electronically signed out by:     Hernandez Rosas M.D.       CLINICAL HISTORY:   Soft tissue mass neck, symptomatic left wrist cyst; excision large   sebaceous cyst back of neck and excision left wrist volar ganglion cyst       GROSS:   The specimen is received in a container labeled left wrist ganglion   cyst.  It consists of what appears to be a collapsed cystic structure.   Reconstructed this measures 1.3 cm in diameter.  The cyst wall measures   from less than 0.1 to 0.2 cm in thickness.  The external surface is   predominantly smooth and glistening.  On sectioning no unusual thickened   areas are identified.  The tissue is bisected and entirely submitted in   a single cassette. (2 TE 1 block). (Dictated by: Ten Esquivel   4/30/2015 02:31 PM)     MICROSCOPIC:   Microscopic sections show synovial tissue with acellular fluid present.   There is a cystic space. (Dictated by: Hernandez Rosas MD 5/4/2015 01:47   PM)             CPT Codes   A: 26311-HL4     TESTING LAB LOCATION:   [address] [address]   [phone]     COLLECTION SITE:   Client[facility]   [facility]

## 2020-12-21 NOTE — LETTER
"12/21/2020       RE: Kevyn Henriquez  [address] [address] [address]     Dear Colleague,    Thank you for referring your patient, Kevyn Henriquez, to the [facility] ENDOCR[facility]OLOG[facility] [place] at [facility]. Please see a copy of my visit note below.    Kevyn Henriquez is a 70 year old male who is being evaluated via a billable telephone visit.      The patient has been notified of following:     \"This telephone visit will be conducted via a call between you and your physician/provider. We have found that certain health care needs can be provided without the need for a physical exam.  This service lets us provide the care you need with a short phone conversation.  If a prescription is necessary we can send it directly to your pharmacy.  If lab work is needed we can place an order for that and you can then stop by our lab to have the test done at a later time.    Telephone visits are billed at different rates depending on your insurance coverage. During this emergency period, for some insurers they may be billed the same as an in-person visit.  Please reach out to your insurance provider with any questions.    If during the course of the call the physician/provider feels a telephone visit is not appropriate, you will not be charged for this service.\"    Patient has given verbal consent for Telephone visit?  Yes    What phone number would you like to be contacted at? [phone]    How would you like to obtain your AVS? Mail a copy    Alicia Waterman MA        Endocrine Consult Video visit note-     Attending Assessment/Plan :   Left thyroid nodule 4.4 cm - benign cytology. It causes some tracheal deviation. It appears to be asymptomatic.  I have counseled him on expected < 7% false negative rate.  He is already scheduled for follow up US in 2/2021-- this is very soon .    Labs to include calcitonin to prove it normal    Iodine contrast exposure 11/5/2020 CT " with contrast - in susceptible individuals this could cause thyroid dysfunciton.    HTN - new diagnosis .       Due to the COVID 19 pandemic this visit was a telephone /video visit in order to help prevent spread of infection in this high risk patient and the general population. The patient gave verbal consent for the visit today.    Start time doximity 1500  Stop time 1525  Total time 25 minutes    Chief complaint:  Kevyn is a 70 year old male seen in consultation at the request of Dr Celine Orona for thyroid nodule.  I have reviewed Care Everywhere including  [facility] lab reports, imaging reports and provider notes as indicated.      HISTORY OF PRESENT ILLNESS  Kevyn had never been told of goiter or thyroid concern prior to the incidental finding on imaging in 10/2020.    Left thyroid nodule was first noted on MRI C spine 10/7/2020. This was followed by US and FNAB and also CT neck.  That day (10/7/2020) he had presented to the [place] ED due to abnormal finding on brain MRI showing acute right cerebellar tonsillar infarct.  He reported 3 weeks of visual symptoms.    He has never taken thyroid medication.  He can't recall childhood radiation exposures.  There is no family history of thyroid disease other than a second cousin who had surgery during childhood. There is no known family history of thyroid cancer.     We have the following labs:   8/14/15 TSH 1.08  10/8/2020 TSH 1.04  12/7/2020 : Ca 9.2, creatinine 0.71, glucose 103    I have reviewed images on pACS  10/7/2020 MRI C spine - larget left thyroid mass 4.8 cm tall   10/14/2020 thyroid US:   Large nodule fills/ expands left lobe 4.3 x 4 x 4.4 cm ; thin halo.  Grade 2-3 blood flow;  FNAB 10/20/2020: benign  11/5/2020 CT neck with contrast asymmetrical thyroid with large mass left causing some deviation of trachea to the right. 4.4 x 4.1 x     REVIEW OF SYSTEMS  Weight is up 5-6 lbs since the vision went  No swallow symptoms  No voice symptoms  1.5  weeks of noticing it - some discomfort - once he took a single tylenol for the discomfort   Sleeps on side; no head position symptoms that choke  No choking with hands over head  Don't hear well  Had bulging disc in the neck - he is careful with the pillow due to this  Vision is still unclear since the stroke - getting better slowly   Cardiac: negative; coughs with really cold air - weather;   Respiratory: negative  GI: negative  Memory is stable  No weakness  In 2015 he had sebaceous cyst on the back on the neck  10 system ROS otherwise as per the HPI or negative    Past Medical History  Past Medical History:   Diagnosis Date     Acute recurrent maxillary sinusitis 09/2020     Cerebellar infarct (H) 10/07/2020     Colon polyps 2018     Thyroid nodule 10/2020     Tobacco abuse      Cervical disc    Past Surgical History:   Procedure Laterality Date     COLONOSCOPY N/A 6/25/2018    Procedure: COLONOSCOPY;  COLONOSCOPY WITH  POLYPECTOMY;  Surgeon: Marino Arzola MD;  Location: [facility]     EXCISE GANGLION WRIST Left 4/30/2015    Procedure: EXCISE GANGLION WRIST;  Surgeon: Phyllis Tuttle MD;  Location: [facility]     EXCISE MASS NECK N/A 4/30/2015    Procedure: EXCISE MASS NECK;  Surgeon: Phyllis Tuttle MD;  Location: [facility] OR     HC EXCIS PRIMARY GANGLION WRIST      left wrist       Medications  Current Outpatient Medications   Medication Sig Dispense Refill     aspirin (ASA) 325 MG tablet Take 325 mg by mouth daily       atorvastatin (LIPITOR) 40 MG tablet Take 1 tablet (40 mg) by mouth daily 90 tablet 3     lisinopril (ZESTRIL) 20 MG tablet Take 1 tablet (20 mg) by mouth daily 30 tablet 1     UNABLE TO FIND 1 tablet 2 times daily preservision with vitamins       Allergies  No Known Allergies    Family History  family history includes Diabetes in his mother; Heart Disease in his father; Hypertension in his brother, brother, and father.    Social History  Social History     Tobacco Use     Smoking status: Light  "Tobacco Smoker     Types: Cigarettes     Smokeless tobacco: Never Used     Tobacco comment: 1 cigs daily  10/22/20   Substance Use Topics     Alcohol use: No     Drug use: No     Walks 2 dogs every day at least once for 1.5-2 miles; 4-5 cigarettes I nthe day; likes to putter , read, computer and can't do any of that stuff due to his vision.     Physical Exam  There were no vitals taken for this visit.  There is no height or weight on file to calculate BMI.   BP Readings from Last 1 Encounters:   12/07/20 130/82      Pulse Readings from Last 1 Encounters:   12/07/20 74      Resp Readings from Last 1 Encounters:   10/20/20 16      Temp Readings from Last 1 Encounters:   12/07/20 96.8  F (36  C) (Tympanic)      SpO2 Readings from Last 1 Encounters:   12/07/20 96%      Wt Readings from Last 1 Encounters:   12/07/20 102.1 kg (225 lb)      Ht Readings from Last 1 Encounters:   12/07/20 1.727 m (5' 8\")     GENERAL:pleasant man in  no distress; initially poor connection with lots of movement ; he moved outside which helped audio and video;   SKIN: Visible skin clear. No significant rash, abnormal pigmentation or lesions.  EYES: Eyes grossly normal to inspection.  No discharge or erythema, or obvious scleral/conjunctival abnormalities.  NECK I do not see the left thyroid mass even when trying to with his head hyperextended.   RESP: No audible wheeze, cough, or visible cyanosis.  No visible retractions or increased work of breathing.    NEURO: Cranial nerves grossly intact.  Awake, alert, responds appropriately to questions.  Mentation and speech fluent.  PSYCH:affect normal, judgement and insight intact, and appearance well-groomed.    DATA REVIEW    Patient Name: REBECCA TOMPKINS   MR#: [id number]   Specimen #: [id number]   Collected: 10/20/2020   Received: 10/20/2020   Reported: 10/21/2020 11:22   Ordering Phy(s): MAGALYS XIE   Additional Phy(s): MARIA GUADALUPE LOPEZ   For improved result formatting, select 'View Enhanced " Report Format' under    Linked Documents section.   SPECIMEN/STAIN PROCESS:   Thyroid, # , ultrasound guided fine needle aspiration, left nodule  Diff Quick Stain-cyto x 8, Pap-Cyto x 8, Cell Block w/ H&E-Cyto x 1   -------------------------------------------------------------   CYTOLOGIC INTERPRETATION:   Thyroid, # , ultrasound guided fine needle aspiration, left nodule:   Benign   Consistent with a benign nodule (includes adenomatoid nodule, colloid nodule, etc.)   The [place] implied risk of malignancy and recommended clinical  management:   Benign has a 0-3% risk of malignancy, recommended management is clinical  follow-up   Specimen Adequacy: Satisfactory for evaluation.   Electronically signed out by:   Hernandez Rosas M.D.     CLINICAL HISTORY:   MICROSCOPIC:   Smears and a cell block are examined.     CPT Codes:   A: 74975-BODJ, 99285-KTH, HCB     COLLECTION SITE:   Client:  [facility]   Location:  [facility] (B)     The technical component of this testing was completed at the [facility], with the   professional component performed at the [facility], [address] [place] [address]   ([phone])       Patient Name: REBECCA TOMPKINS   MR#: [id number]   Specimen #:    Collected: 4/30/2015   Received: 4/30/2015   Reported: 5/4/2015 18:01   Ordering Phy(s): CAROLYN FLORES   Additional Phy(s): MAGALYS XIE               SPECIMEN(S):   Ganglion cyst left wrist     FINAL DIAGNOSIS:   Soft tissue, left wrist, excision   -Ganglion cyst     Electronically signed out by:     Hernandez Rosas M.D.       CLINICAL HISTORY:   Soft tissue mass neck, symptomatic left wrist cyst; excision large   sebaceous cyst back of neck and excision left wrist volar ganglion cyst       GROSS:   The specimen is received in a container labeled left wrist ganglion   cyst.  It consists of what appears to be a collapsed cystic structure.   Reconstructed this  measures 1.3 cm in diameter.  The cyst wall measures   from less than 0.1 to 0.2 cm in thickness.  The external surface is   predominantly smooth and glistening.  On sectioning no unusual thickened   areas are identified.  The tissue is bisected and entirely submitted in   a single cassette. (2 TE 1 block). (Dictated by: Ten Esquivel   4/30/2015 02:31 PM)     MICROSCOPIC:   Microscopic sections show synovial tissue with acellular fluid present.   There is a cystic space. (Dictated by: Hernandez Rosas MD 5/4/2015 01:47   PM)             CPT Codes   A: 14304-PO8     TESTING LAB LOCATION:   [address] [address]   [phone]     COLLECTION SITE:   Client: [facility]   Location: JENNIFER RINCON)

## 2021-01-11 ENCOUNTER — TELEPHONE (OUTPATIENT)
Dept: FAMILY MEDICINE | Facility: OTHER | Age: 71
End: 2021-01-11

## 2021-01-11 NOTE — TELEPHONE ENCOUNTER
9:37 AM    Reason for Call: Phone Call    Description: Pt called has [employer] for insurance, pt is pushing back after telling pt his visit would not be covered by insurance.  I offered to transfer him to Saima Vasquez as this was the direction we were told to use.  Also wishing to discuss covid vaccine, informed him that we have not timeline at this time as to when the next phases would be avail. Please call back pt     Was an appointment offered for this call? Yes  If yes : Appointment type med review/bp check               Date 2/17/2021 with Dr. Espinoza.    Preferred method for responding to this message: Telephone Call  What is your phone number ?    If we cannot reach you directly, may we leave a detailed response at the number you provided? Yes    Can this message wait until your PCP/provider returns, if available today? YES    Frances Damon

## 2021-01-12 NOTE — TELEPHONE ENCOUNTER
Spoke with patient and informed him of information noted in prior note. Pt did agree at this time to speak to Saima further r/t the insurance issue.   Soco Luna LPN

## 2021-05-25 ENCOUNTER — TRANSFERRED RECORDS (OUTPATIENT)
Dept: HEALTH INFORMATION MANAGEMENT | Facility: CLINIC | Age: 71
End: 2021-05-25

## 2021-09-24 ENCOUNTER — TELEPHONE (OUTPATIENT)
Dept: OTOLARYNGOLOGY | Facility: CLINIC | Age: 71
End: 2021-09-24

## 2022-08-28 ENCOUNTER — HOSPITAL ENCOUNTER (EMERGENCY)
Facility: HOSPITAL | Age: 72
Discharge: HOME | End: 2022-08-28
Attending: PHYSICIAN ASSISTANT | Admitting: PHYSICIAN ASSISTANT
Payer: COMMERCIAL

## 2022-08-28 VITALS
TEMPERATURE: 97.9 F | SYSTOLIC BLOOD PRESSURE: 154 MMHG | OXYGEN SATURATION: 95 % | DIASTOLIC BLOOD PRESSURE: 85 MMHG | HEART RATE: 68 BPM | RESPIRATION RATE: 16 BRPM

## 2022-08-28 DIAGNOSIS — M10.072 ACUTE IDIOPATHIC GOUT OF LEFT FOOT: ICD-10-CM

## 2022-08-28 PROCEDURE — G0463 HOSPITAL OUTPT CLINIC VISIT: HCPCS

## 2022-08-28 PROCEDURE — 99213 OFFICE O/P EST LOW 20 MIN: CPT | Performed by: PHYSICIAN ASSISTANT

## 2022-08-28 RX ORDER — PREDNISONE 20 MG/1
TABLET ORAL
Qty: 10 TABLET | Refills: 0 | Status: SHIPPED | OUTPATIENT
Start: 2022-08-28

## 2022-08-28 ASSESSMENT — ENCOUNTER SYMPTOMS: COLOR CHANGE: 1

## 2022-08-28 NOTE — ED PROVIDER NOTES
History     Chief Complaint   Patient presents with     Foot Pain     HPI  Kevyn Henriquez is a 71 year old male who presents to urgent care for evaluation of redness and pain in left foot.  Patient states that he woke up yesterday morning and noted warmth, redness and pain in his left ankle and left foot.  Patient denies any mechanism of injury, fevers, chills, or any other associated symptoms.  Patient states that he was diagnosed with pseudogout 1 time prior years ago.    Allergies:  No Known Allergies    Problem List:    Patient Active Problem List    Diagnosis Date Noted     Essential hypertension 12/07/2020     Priority: Medium     Right acoustic neuroma (H) 10/22/2020     Priority: Medium     Left thyroid nodule 10/22/2020     Priority: Medium     5.7 cm left nodule, benign fnab       ASNHL (asymmetrical sensorineural hearing loss) 10/22/2020     Priority: Medium     Cerebellar infarct (H) 10/07/2020     Priority: Medium     Tobacco abuse      Priority: Medium        Past Medical History:    Past Medical History:   Diagnosis Date     Acoustic neuroma (H) 10/07/2020     Acute recurrent maxillary sinusitis 09/2020     Cerebellar infarct (H) 10/07/2020     Colon polyps 2018     Thyroid nodule 10/2020     Tobacco abuse        Past Surgical History:    Past Surgical History:   Procedure Laterality Date     COLONOSCOPY N/A 6/25/2018    Procedure: COLONOSCOPY;  COLONOSCOPY WITH  POLYPECTOMY;  Surgeon: Marino Arzola MD;  Location: [facility]     EXCISE GANGLION WRIST Left 4/30/2015    Procedure: EXCISE GANGLION WRIST;  Surgeon: Phyllis Tuttle MD;  Location: [facility]     EXCISE MASS NECK N/A 4/30/2015    Procedure: EXCISE MASS NECK;  Surgeon: Phyllis Tuttle MD;  Location: [facility]     HC EXCIS PRIMARY GANGLION WRIST      left wrist       Family History:    Family History   Problem Relation Age of Onset     Diabetes Mother         borderline diabetes     Heart Disease Father      Hypertension Father       Hypertension Brother      Hypertension Brother      Thyroid Disease Cousin         tx in childhood     Thyroid Cancer No family hx of        Social History:  Marital Status:   [2]  Social History     Tobacco Use     Smoking status: Light Tobacco Smoker     Types: Cigarettes     Smokeless tobacco: Never Used     Tobacco comment: 1 cigs daily  10/22/20   Substance Use Topics     Alcohol use: No     Drug use: No        Medications:    aspirin (ASA) 325 MG tablet  atorvastatin (LIPITOR) 40 MG tablet  lisinopril (ZESTRIL) 20 MG tablet  predniSONE (DELTASONE) 20 MG tablet          Review of Systems   Skin: Positive for color change.   All other systems reviewed and are negative.      Physical Exam   BP: 154/85  Pulse: 68  Temp: 97.9  F (36.6  C)  Resp: 16  SpO2: 95 %      Physical Exam  Vitals and nursing note reviewed.   Constitutional:       General: He is not in acute distress.     Appearance: Normal appearance. He is not ill-appearing or toxic-appearing.   Cardiovascular:      Rate and Rhythm: Regular rhythm.      Heart sounds: Normal heart sounds.   Pulmonary:      Breath sounds: Normal breath sounds.   Musculoskeletal:        Feet:    Feet:      Comments: Erythema, swelling, and warmth appreciated over the dorsal lateral aspect of left foot.  No wound is appreciated.  This area is painful with palpation.  Neurological:      Mental Status: He is oriented to person, place, and time.         ED Course                 Procedures             Critical Care time:               No results found for this or any previous visit (from the past 24 hour(s)).    Medications - No data to display    Assessments & Plan (with Medical Decision Making)   #1.  Gout, left foot    Discussed exam findings with patient.  Patient is prescribed course of prednisone.  He is instructed on implementing low purine diet.  Any additional concerns he should return to urgent care or follow-up with primary care provider.  Patient verbalized  understanding and agreement of plan.    I have reviewed the nursing notes.    I have reviewed the findings, diagnosis, plan and need for follow up with the patient.    Discharge Medication List as of 8/28/2022 10:23 AM          Final diagnoses:   Acute idiopathic gout of left foot       8/28/2022   [facility]     Dominick Sheehan PA-C  08/28/22 102

## 2022-08-28 NOTE — ED TRIAGE NOTES
Pt presents with pain, redness and swelling. Denies any injury. Pt has had this pain since yesterday morning.

## 2024-07-07 ENCOUNTER — HOSPITAL ENCOUNTER (EMERGENCY)
Facility: HOSPITAL | Age: 74
Discharge: HOME | End: 2024-07-07
Attending: INTERNAL MEDICINE | Admitting: INTERNAL MEDICINE
Payer: COMMERCIAL

## 2024-07-07 ENCOUNTER — APPOINTMENT (OUTPATIENT)
Dept: GENERAL RADIOLOGY | Facility: HOSPITAL | Age: 74
End: 2024-07-07
Attending: INTERNAL MEDICINE
Payer: COMMERCIAL

## 2024-07-07 VITALS
OXYGEN SATURATION: 93 % | DIASTOLIC BLOOD PRESSURE: 97 MMHG | HEART RATE: 93 BPM | TEMPERATURE: 97 F | SYSTOLIC BLOOD PRESSURE: 132 MMHG | RESPIRATION RATE: 15 BRPM

## 2024-07-07 DIAGNOSIS — R06.02 SOB (SHORTNESS OF BREATH): ICD-10-CM

## 2024-07-07 DIAGNOSIS — I48.92 ATRIAL FLUTTER WITH RAPID VENTRICULAR RESPONSE (H): ICD-10-CM

## 2024-07-07 LAB
ALBUMIN SERPL BCG-MCNC: 4.3 G/DL (ref 3.5–5.2)
ALP SERPL-CCNC: 57 U/L (ref 40–150)
ALT SERPL W P-5'-P-CCNC: 20 U/L (ref 0–70)
ANION GAP SERPL CALCULATED.3IONS-SCNC: 11 MMOL/L (ref 7–15)
AST SERPL W P-5'-P-CCNC: 25 U/L (ref 0–45)
BASOPHILS # BLD AUTO: 0.1 10E3/UL (ref 0–0.2)
BASOPHILS NFR BLD AUTO: 1 %
BILIRUB SERPL-MCNC: 0.7 MG/DL
BUN SERPL-MCNC: 14.5 MG/DL (ref 8–23)
CALCIUM SERPL-MCNC: 9.8 MG/DL (ref 8.8–10.2)
CHLORIDE SERPL-SCNC: 105 MMOL/L (ref 98–107)
CREAT SERPL-MCNC: 1.02 MG/DL (ref 0.67–1.17)
CRP SERPL-MCNC: <3 MG/L
DEPRECATED HCO3 PLAS-SCNC: 23 MMOL/L (ref 22–29)
EGFRCR SERPLBLD CKD-EPI 2021: 79 ML/MIN/1.73M2
EOSINOPHIL # BLD AUTO: 0.2 10E3/UL (ref 0–0.7)
EOSINOPHIL NFR BLD AUTO: 2 %
ERYTHROCYTE [DISTWIDTH] IN BLOOD BY AUTOMATED COUNT: 12.1 % (ref 10–15)
GLUCOSE SERPL-MCNC: 119 MG/DL (ref 70–99)
HCT VFR BLD AUTO: 50.8 % (ref 40–53)
HGB BLD-MCNC: 16.9 G/DL (ref 13.3–17.7)
HOLD SPECIMEN: NORMAL
IMM GRANULOCYTES # BLD: 0 10E3/UL
IMM GRANULOCYTES NFR BLD: 0 %
LYMPHOCYTES # BLD AUTO: 2.2 10E3/UL (ref 0.8–5.3)
LYMPHOCYTES NFR BLD AUTO: 25 %
MCH RBC QN AUTO: 30.8 PG (ref 26.5–33)
MCHC RBC AUTO-ENTMCNC: 33.3 G/DL (ref 31.5–36.5)
MCV RBC AUTO: 93 FL (ref 78–100)
MONOCYTES # BLD AUTO: 0.8 10E3/UL (ref 0–1.3)
MONOCYTES NFR BLD AUTO: 8 %
NEUTROPHILS # BLD AUTO: 5.8 10E3/UL (ref 1.6–8.3)
NEUTROPHILS NFR BLD AUTO: 64 %
NRBC # BLD AUTO: 0 10E3/UL
NRBC BLD AUTO-RTO: 0 /100
NT-PROBNP SERPL-MCNC: 1988 PG/ML (ref 0–900)
PLATELET # BLD AUTO: 231 10E3/UL (ref 150–450)
POTASSIUM SERPL-SCNC: 4.5 MMOL/L (ref 3.4–5.3)
PROT SERPL-MCNC: 7.7 G/DL (ref 6.4–8.3)
RBC # BLD AUTO: 5.48 10E6/UL (ref 4.4–5.9)
SODIUM SERPL-SCNC: 139 MMOL/L (ref 135–145)
TROPONIN T SERPL HS-MCNC: 17 NG/L
TSH SERPL DL<=0.005 MIU/L-ACNC: 1.76 UIU/ML (ref 0.3–4.2)
WBC # BLD AUTO: 9 10E3/UL (ref 4–11)

## 2024-07-07 PROCEDURE — 99285 EMERGENCY DEPT VISIT HI MDM: CPT | Mod: 25

## 2024-07-07 PROCEDURE — 93010 ELECTROCARDIOGRAM REPORT: CPT | Performed by: INTERNAL MEDICINE

## 2024-07-07 PROCEDURE — 83880 ASSAY OF NATRIURETIC PEPTIDE: CPT | Performed by: INTERNAL MEDICINE

## 2024-07-07 PROCEDURE — 250N000013 HC RX MED GY IP 250 OP 250 PS 637: Performed by: STUDENT IN AN ORGANIZED HEALTH CARE EDUCATION/TRAINING PROGRAM

## 2024-07-07 PROCEDURE — 80053 COMPREHEN METABOLIC PANEL: CPT | Performed by: INTERNAL MEDICINE

## 2024-07-07 PROCEDURE — 84484 ASSAY OF TROPONIN QUANT: CPT | Performed by: INTERNAL MEDICINE

## 2024-07-07 PROCEDURE — 85025 COMPLETE CBC W/AUTO DIFF WBC: CPT | Performed by: INTERNAL MEDICINE

## 2024-07-07 PROCEDURE — 99284 EMERGENCY DEPT VISIT MOD MDM: CPT | Performed by: INTERNAL MEDICINE

## 2024-07-07 PROCEDURE — 250N000011 HC RX IP 250 OP 636: Performed by: STUDENT IN AN ORGANIZED HEALTH CARE EDUCATION/TRAINING PROGRAM

## 2024-07-07 PROCEDURE — 36415 COLL VENOUS BLD VENIPUNCTURE: CPT | Performed by: INTERNAL MEDICINE

## 2024-07-07 PROCEDURE — 96374 THER/PROPH/DIAG INJ IV PUSH: CPT

## 2024-07-07 PROCEDURE — 84443 ASSAY THYROID STIM HORMONE: CPT | Performed by: INTERNAL MEDICINE

## 2024-07-07 PROCEDURE — 71045 X-RAY EXAM CHEST 1 VIEW: CPT

## 2024-07-07 PROCEDURE — 86140 C-REACTIVE PROTEIN: CPT | Performed by: INTERNAL MEDICINE

## 2024-07-07 PROCEDURE — 93005 ELECTROCARDIOGRAM TRACING: CPT

## 2024-07-07 RX ORDER — DILTIAZEM HYDROCHLORIDE 120 MG/1
240 CAPSULE, COATED, EXTENDED RELEASE ORAL DAILY
Status: DISCONTINUED | OUTPATIENT
Start: 2024-07-07 | End: 2024-07-07 | Stop reason: HOSPADM

## 2024-07-07 RX ORDER — DILTIAZEM HYDROCHLORIDE 5 MG/ML
20 INJECTION INTRAVENOUS ONCE
Status: COMPLETED | OUTPATIENT
Start: 2024-07-07 | End: 2024-07-07

## 2024-07-07 RX ORDER — DILTIAZEM HYDROCHLORIDE 240 MG/1
240 CAPSULE, COATED, EXTENDED RELEASE ORAL DAILY
Qty: 30 CAPSULE | Refills: 1 | Status: SHIPPED | OUTPATIENT
Start: 2024-07-07

## 2024-07-07 RX ADMIN — DILTIAZEM HYDROCHLORIDE 20 MG: 5 INJECTION, SOLUTION INTRAVENOUS at 07:19

## 2024-07-07 RX ADMIN — DILTIAZEM HYDROCHLORIDE 240 MG: 120 CAPSULE, COATED, EXTENDED RELEASE ORAL at 08:26

## 2024-07-07 ASSESSMENT — ENCOUNTER SYMPTOMS
FEVER: 0
DIAPHORESIS: 0
COUGH: 0
BACK PAIN: 0
FLANK PAIN: 0
SHORTNESS OF BREATH: 1
PALPITATIONS: 0
CHILLS: 0
FREQUENCY: 0
ANAL BLEEDING: 0
VOMITING: 0
NUMBNESS: 0
LIGHT-HEADEDNESS: 0
DYSURIA: 0
ABDOMINAL DISTENTION: 0
SLEEP DISTURBANCE: 0
HEADACHES: 0
MYALGIAS: 0
NAUSEA: 0
CONFUSION: 0
VOICE CHANGE: 0
BLOOD IN STOOL: 0
CHEST TIGHTNESS: 0
WHEEZING: 0
COLOR CHANGE: 0
ABDOMINAL PAIN: 0
WEAKNESS: 0
DIZZINESS: 0
NECK PAIN: 0

## 2024-07-07 ASSESSMENT — ACTIVITIES OF DAILY LIVING (ADL)
ADLS_ACUITY_SCORE: 35
ADLS_ACUITY_SCORE: 35

## 2024-07-07 NOTE — ED PROVIDER NOTES
History     Chief Complaint   Patient presents with    Shortness of Breath     The history is provided by the patient.   Shortness of Breath  Severity:  Moderate  Onset quality:  Gradual  Duration:  4 days  Timing:  Intermittent  Progression:  Waxing and waning  Chronicity:  New  Context: not activity    Associated symptoms: no abdominal pain, no chest pain, no cough, no diaphoresis, no fever, no headaches, no neck pain, no rash, no vomiting and no wheezing        Allergies:  No Known Allergies    Problem List:    Patient Active Problem List    Diagnosis Date Noted    Essential hypertension 12/07/2020     Priority: Medium    Right acoustic neuroma (H) 10/22/2020     Priority: Medium    Left thyroid nodule 10/22/2020     Priority: Medium     5.7 cm left nodule, benign fnab      ASNHL (asymmetrical sensorineural hearing loss) 10/22/2020     Priority: Medium    Cerebellar infarct (H) 10/07/2020     Priority: Medium    Tobacco abuse      Priority: Medium        Past Medical History:    Past Medical History:   Diagnosis Date    Acoustic neuroma (H) 10/07/2020    Acute recurrent maxillary sinusitis 09/2020    Cerebellar infarct (H) 10/07/2020    Colon polyps 2018    Thyroid nodule 10/2020    Tobacco abuse        Past Surgical History:    Past Surgical History:   Procedure Laterality Date    COLONOSCOPY N/A 6/25/2018    Procedure: COLONOSCOPY;  COLONOSCOPY WITH  POLYPECTOMY;  Surgeon: Marino Arzola MD;  Location: [facility]    EXCISE GANGLION WRIST Left 4/30/2015    Procedure: EXCISE GANGLION WRIST;  Surgeon: Phyllis Tuttle MD;  Location: [facility]    EXCISE MASS NECK N/A 4/30/2015    Procedure: EXCISE MASS NECK;  Surgeon: Phyllis Tuttle MD;  Location: [facility]    HC EXCIS PRIMARY GANGLION WRIST      left wrist       Family History:    Family History   Problem Relation Age of Onset    Diabetes Mother         borderline diabetes    Heart Disease Father     Hypertension Father     Hypertension Brother     Hypertension  Brother     Thyroid Disease Cousin         tx in childhood    Thyroid Cancer No family hx of        Social History:  Marital Status:   [2]  Social History     Tobacco Use    Smoking status: Light Smoker     Types: Cigarettes    Smokeless tobacco: Never    Tobacco comments:     1 cigs daily  10/22/20   Substance Use Topics    Alcohol use: No    Drug use: No        Medications:    apixaban ANTICOAGULANT (ELIQUIS ANTICOAGULANT) 5 MG tablet  diltiazem ER COATED BEADS (CARDIZEM CD/CARTIA XT) 240 MG 24 hr capsule  aspirin (ASA) 325 MG tablet  atorvastatin (LIPITOR) 40 MG tablet  lisinopril (ZESTRIL) 20 MG tablet  predniSONE (DELTASONE) 20 MG tablet          Review of Systems   Constitutional:  Negative for chills, diaphoresis and fever.   HENT:  Negative for voice change.    Eyes:  Negative for visual disturbance.   Respiratory:  Positive for shortness of breath. Negative for cough, chest tightness and wheezing.    Cardiovascular:  Negative for chest pain, palpitations and leg swelling.   Gastrointestinal:  Negative for abdominal distention, abdominal pain, anal bleeding, blood in stool, nausea and vomiting.   Genitourinary:  Negative for decreased urine volume, dysuria, flank pain and frequency.   Musculoskeletal:  Negative for back pain, gait problem, myalgias and neck pain.   Skin:  Negative for color change, pallor and rash.   Neurological:  Negative for dizziness, syncope, weakness, light-headedness, numbness and headaches.   Psychiatric/Behavioral:  Negative for confusion, sleep disturbance and suicidal ideas.        Physical Exam   BP: (!) 136/114  Pulse: (!) 142  Temp: 97  F (36.1  C)  Resp: 22  SpO2: 95 %      Physical Exam  Vitals and nursing note reviewed.   Constitutional:       Appearance: He is well-developed.   HENT:      Head: Normocephalic and atraumatic.   Eyes:      Conjunctiva/sclera: Conjunctivae normal.      Pupils: Pupils are equal, round, and reactive to light.   Neck:      Thyroid: No  thyromegaly.      Vascular: No JVD.      Trachea: No tracheal deviation.   Cardiovascular:      Rate and Rhythm: Tachycardia present. Rhythm irregular.      Heart sounds: Normal heart sounds. No murmur heard.     No gallop.   Pulmonary:      Effort: Pulmonary effort is normal. No respiratory distress.      Breath sounds: Normal breath sounds. No stridor. No wheezing or rales.   Chest:      Chest wall: No tenderness.   Abdominal:      General: Bowel sounds are normal. There is no distension.      Palpations: Abdomen is soft. There is no mass.      Tenderness: There is no abdominal tenderness. There is no guarding or rebound.   Musculoskeletal:         General: No tenderness. Normal range of motion.      Cervical back: Normal range of motion and neck supple.   Lymphadenopathy:      Cervical: No cervical adenopathy.   Skin:     General: Skin is warm.      Coloration: Skin is not pale.      Findings: No erythema or rash.   Neurological:      Mental Status: He is alert and oriented to person, place, and time.   Psychiatric:         Behavior: Behavior normal.         ED Course        Procedures           No results found for this or any previous visit (from the past 24 hour(s)).      Medications   diltiazem (CARDIZEM) injection 20 mg (20 mg Intravenous $Given 7/7/24 0719)       Assessments & Plan (with Medical Decision Making)   Subjective feeling of SOB off and on for 4 days    EKG: tachycardia @ 140    Labs , CXR pending  S/o to Dr Real at the change of shift.  I have reviewed the nursing notes.    I have reviewed the findings, diagnosis, plan and need for follow up with the patient.          Discharge Medication List as of 7/7/2024  8:11 AM        START taking these medications    Details   apixaban ANTICOAGULANT (ELIQUIS ANTICOAGULANT) 5 MG tablet Take 1 tablet (5 mg) by mouth 2 times daily, Disp-60 tablet, R-1, E-Prescribe      diltiazem ER COATED BEADS (CARDIZEM CD/CARTIA XT) 240 MG 24 hr capsule Take 1 capsule  (240 mg) by mouth daily, Disp-30 capsule, R-1, E-Prescribe             Final diagnoses:   SOB (shortness of breath)   Atrial flutter with rapid ventricular response (H)       7/7/2024   [facility]       Bradley Renee MD  07/07/24 0652       Bradley Renee MD  07/12/24 2056

## 2024-07-07 NOTE — ED TRIAGE NOTES
Arrives to ED from home by POV. C/O SOB X3-4 days especially when lying down flat. Denies any pain. Able to speak in full sentences. Color pink, warm and dry.      Triage Assessment (Adult)       Row Name 07/07/24 0626          Triage Assessment    Airway WDL WDL     Additional Documentation Breath Sounds (Group)        Respiratory WDL    Respiratory WDL X;rhythm/pattern;expansion/retractions     Rhythm/Pattern, Respiratory shortness of breath;depth regular;pattern regular;unlabored     Expansion/Accessory Muscles/Retractions expansion symmetric        Breath Sounds    Breath Sounds All Fields     All Lung Fields Breath Sounds equal bilaterally;clear        Skin Circulation/Temperature WDL    Skin Circulation/Temperature WDL WDL        Cardiac WDL    Cardiac WDL X     Cardiac Rhythm ST;Atrial flutter;Other (Comment)  ST VS POSSIBLE ATRIAL FLUTTER        Peripheral/Neurovascular WDL    Peripheral Neurovascular WDL WDL        Cognitive/Neuro/Behavioral WDL    Cognitive/Neuro/Behavioral WDL WDL

## 2024-07-07 NOTE — ED NOTES
Patient discharged to Home at this time. Patient given written and verbal discharge instructions regarding home care, follow-up, and medications. Patient verbalized understanding of all discharge instructions. Rest and hydration encouraged. Patient encouraged to return to the ED if they experience new, worsening, or concerning symptoms.     Patients RX for diltiazem and eliquis sent to [facility] pharmacy.    Patient to follow-up with PCP in 1 day. Pt will need to obtain cardiology referral from PCP.

## 2024-07-07 NOTE — ED NOTES
"HR beginning to slow after dose of cardizem IV (see MAR). Provider updated. She requests \"to wait awhile to repeat EKG to see if rhythm slows more\".  "

## 2024-07-07 NOTE — DISCHARGE INSTRUCTIONS
Please cut down your daily aspirin dosing to 81 mg.  Please call your primary care physician on Monday morning and let them know that you have been diagnosed with atrial flutter in the evening started on Eliquis and Cardizem.  I have sent a note to our  to try to get to referral with cardiology.  We will call you this coming week about that appointment.

## 2024-07-08 LAB
ATRIAL RATE - MUSE: 280 BPM
ATRIAL RATE - MUSE: NORMAL BPM
DIASTOLIC BLOOD PRESSURE - MUSE: NORMAL MMHG
DIASTOLIC BLOOD PRESSURE - MUSE: NORMAL MMHG
INTERPRETATION ECG - MUSE: NORMAL
INTERPRETATION ECG - MUSE: NORMAL
P AXIS - MUSE: 255 DEGREES
P AXIS - MUSE: NORMAL DEGREES
PR INTERVAL - MUSE: NORMAL MS
PR INTERVAL - MUSE: NORMAL MS
QRS DURATION - MUSE: 142 MS
QRS DURATION - MUSE: 144 MS
QT - MUSE: 376 MS
QT - MUSE: 432 MS
QTC - MUSE: 466 MS
QTC - MUSE: 578 MS
R AXIS - MUSE: -72 DEGREES
R AXIS - MUSE: -76 DEGREES
SYSTOLIC BLOOD PRESSURE - MUSE: NORMAL MMHG
SYSTOLIC BLOOD PRESSURE - MUSE: NORMAL MMHG
T AXIS - MUSE: -86 DEGREES
T AXIS - MUSE: 7 DEGREES
VENTRICULAR RATE- MUSE: 142 BPM
VENTRICULAR RATE- MUSE: 70 BPM

## 2025-02-02 ENCOUNTER — HOSPITAL ENCOUNTER (EMERGENCY)
Facility: HOSPITAL | Age: 75
Discharge: HOME | End: 2025-02-02
Attending: NURSE PRACTITIONER
Payer: COMMERCIAL

## 2025-02-02 VITALS
HEART RATE: 73 BPM | DIASTOLIC BLOOD PRESSURE: 86 MMHG | OXYGEN SATURATION: 95 % | RESPIRATION RATE: 18 BRPM | TEMPERATURE: 96.4 F | SYSTOLIC BLOOD PRESSURE: 127 MMHG

## 2025-02-02 DIAGNOSIS — H10.32 ACUTE CONJUNCTIVITIS OF LEFT EYE: Primary | ICD-10-CM

## 2025-02-02 PROCEDURE — 99213 OFFICE O/P EST LOW 20 MIN: CPT | Performed by: NURSE PRACTITIONER

## 2025-02-02 PROCEDURE — G0463 HOSPITAL OUTPT CLINIC VISIT: HCPCS

## 2025-02-02 RX ORDER — ROSUVASTATIN CALCIUM 40 MG/1
40 TABLET, COATED ORAL AT BEDTIME
COMMUNITY
Start: 2025-01-25

## 2025-02-02 RX ORDER — METOPROLOL SUCCINATE 25 MG/1
1 TABLET, EXTENDED RELEASE ORAL
COMMUNITY
Start: 2024-08-08

## 2025-02-02 RX ORDER — COLCHICINE 0.6 MG/1
1 TABLET ORAL
COMMUNITY
Start: 2024-10-25

## 2025-02-02 RX ORDER — DILTIAZEM HYDROCHLORIDE 120 MG/1
120 CAPSULE, EXTENDED RELEASE ORAL DAILY
COMMUNITY
Start: 2024-07-15

## 2025-02-02 RX ORDER — HYDROCODONE BITARTRATE AND ACETAMINOPHEN 5; 325 MG/1; MG/1
1 TABLET ORAL EVERY 6 HOURS PRN
COMMUNITY
Start: 2024-10-25

## 2025-02-02 RX ORDER — ROSUVASTATIN CALCIUM 10 MG/1
10 TABLET, COATED ORAL AT BEDTIME
COMMUNITY
Start: 2024-04-01

## 2025-02-02 RX ORDER — TOBRAMYCIN 3 MG/ML
1-2 SOLUTION/ DROPS OPHTHALMIC
Qty: 5 ML | Refills: 0 | Status: SHIPPED | OUTPATIENT
Start: 2025-02-02

## 2025-02-02 RX ORDER — METOPROLOL SUCCINATE 50 MG/1
TABLET, EXTENDED RELEASE ORAL
COMMUNITY
Start: 2024-11-18

## 2025-02-02 RX ORDER — POLYETHYLENE GLYCOL 3350, SODIUM SULFATE ANHYDROUS, SODIUM BICARBONATE, SODIUM CHLORIDE, POTASSIUM CHLORIDE 236; 22.74; 6.74; 5.86; 2.97 G/4L; G/4L; G/4L; G/4L; G/4L
POWDER, FOR SOLUTION ORAL
COMMUNITY
Start: 2024-03-15

## 2025-02-02 RX ORDER — CLOPIDOGREL BISULFATE 75 MG/1
1 TABLET ORAL
COMMUNITY
Start: 2025-01-25

## 2025-02-02 ASSESSMENT — ENCOUNTER SYMPTOMS
EYE ITCHING: 1
RHINORRHEA: 1
PHOTOPHOBIA: 0
EYE REDNESS: 1

## 2025-02-02 ASSESSMENT — VISUAL ACUITY
OD: 20/100
OU: 1
OS: 20/200

## 2025-02-02 ASSESSMENT — ACTIVITIES OF DAILY LIVING (ADL): ADLS_ACUITY_SCORE: 41

## 2025-02-02 NOTE — DISCHARGE INSTRUCTIONS
Applied antibiotic eyedrops to the affected ear as prescribed.    You may continue with Tylenol as needed.    Follow-up with your primary doctor or eye doctor if no improvement in symptoms.  Return to urgent care or emergency room for any worsening or concerning symptoms.

## 2025-02-02 NOTE — ED PROVIDER NOTES
History     Chief Complaint   Patient presents with    Eye Problem     Left eye redness     HPI  Kevyn Henriquez is a 74 year old male who presents to urgent care for evaluation of left eye redness.  Patient initially started with some discomfort to the left eye and then the next day he developed redness along with some blurry vision.  He took Tylenol and used Visine and that helped with managing the symptoms.  Wears eyeglasses.  No contact lens use.  Has not noticed significant discharge to his eye.  But does report nasal discharge.  Sick with URI symptoms about 2 weeks ago.  Also reports that his wife had similar symptoms prior to his starting.    Of note, patient reports history of stroke that affected his eyes.  Reports that he does have some visual field deficits which are unchanged since these current symptoms started.    Allergies:  Allergies   Allergen Reactions    Prednisone Headache       Problem List:    Patient Active Problem List    Diagnosis Date Noted    Essential hypertension 12/07/2020     Priority: Medium    Right acoustic neuroma (H) 10/22/2020     Priority: Medium    Left thyroid nodule 10/22/2020     Priority: Medium     5.7 cm left nodule, benign fnab      ASNHL (asymmetrical sensorineural hearing loss) 10/22/2020     Priority: Medium    Cerebellar infarct (H) 10/07/2020     Priority: Medium    Tobacco abuse      Priority: Medium        Past Medical History:    Past Medical History:   Diagnosis Date    Acoustic neuroma (H) 10/07/2020    Acute recurrent maxillary sinusitis 09/2020    Cerebellar infarct (H) 10/07/2020    Colon polyps 2018    Thyroid nodule 10/2020    Tobacco abuse        Past Surgical History:    Past Surgical History:   Procedure Laterality Date    COLONOSCOPY N/A 6/25/2018    Procedure: COLONOSCOPY;  COLONOSCOPY WITH  POLYPECTOMY;  Surgeon: Marino Arzola MD;  Location: [facility]    EXCISE GANGLION WRIST Left 4/30/2015    Procedure: EXCISE GANGLION WRIST;  Surgeon:  Phyllis Tuttle MD;  Location: [facility]    EXCISE MASS NECK N/A 4/30/2015    Procedure: EXCISE MASS NECK;  Surgeon: Phyllis Tuttle MD;  Location: [facility]    HC EXCIS PRIMARY GANGLION WRIST      left wrist       Family History:    Family History   Problem Relation Age of Onset    Diabetes Mother         borderline diabetes    Heart Disease Father     Hypertension Father     Hypertension Brother     Hypertension Brother     Thyroid Disease Cousin         tx in childhood    Thyroid Cancer No family hx of        Social History:  Marital Status:   [2]  Social History     Tobacco Use    Smoking status: Light Smoker     Types: Cigarettes    Smokeless tobacco: Never    Tobacco comments:     1 cigs daily  10/22/20   Substance Use Topics    Alcohol use: No    Drug use: No        Medications:    clopidogrel (PLAVIX) 75 MG tablet  colchicine (COLCRYS) 0.6 MG tablet  diltiazem ER (TIAZAC) 120 MG 24 hr ER beaded capsule  HYDROcodone-acetaminophen (NORCO) 5-325 MG tablet  metoprolol succinate ER (TOPROL XL) 25 MG 24 hr tablet  metoprolol succinate ER (TOPROL XL) 50 MG 24 hr tablet  PEG 3350-KCl-NaBcb-NaCl-NaSulf (POLYETHYLENE GLYCOL) 236 g solution  rosuvastatin (CRESTOR) 10 MG tablet  rosuvastatin (CRESTOR) 40 MG tablet  tobramycin (TOBREX) 0.3 % ophthalmic solution  apixaban ANTICOAGULANT (ELIQUIS ANTICOAGULANT) 5 MG tablet  aspirin (ASA) 325 MG tablet  atorvastatin (LIPITOR) 40 MG tablet  diltiazem ER COATED BEADS (CARDIZEM CD/CARTIA XT) 240 MG 24 hr capsule  lisinopril (ZESTRIL) 20 MG tablet  predniSONE (DELTASONE) 20 MG tablet          Review of Systems   HENT:  Positive for rhinorrhea.    Eyes:  Positive for redness, itching and visual disturbance. Negative for photophobia.   All other systems reviewed and are negative.      Physical Exam   BP: 127/86  Pulse: 73  Temp: (!) 96.4  F (35.8  C)  Resp: 18  SpO2: 95 %      Physical Exam  Vitals and nursing note reviewed.   Constitutional:       Appearance: Normal  appearance. He is not ill-appearing or toxic-appearing.   HENT:      Head: Atraumatic.      Right Ear: Tympanic membrane and ear canal normal.      Left Ear: Tympanic membrane and ear canal normal.      Nose: Nose normal.      Mouth/Throat:      Mouth: Mucous membranes are moist.   Eyes:      General: Lids are normal. Lids are everted, no foreign bodies appreciated. Vision grossly intact. Gaze aligned appropriately. No allergic shiner or scleral icterus.        Right eye: No discharge.         Left eye: No discharge.      Extraocular Movements: Extraocular movements intact.      Conjunctiva/sclera:      Left eye: Left conjunctiva is injected.      Pupils: Pupils are equal, round, and reactive to light.   Cardiovascular:      Rate and Rhythm: Normal rate.   Pulmonary:      Effort: Pulmonary effort is normal. No respiratory distress.   Musculoskeletal:      Cervical back: Neck supple.   Skin:     General: Skin is warm.      Coloration: Skin is not pale.   Neurological:      Mental Status: He is alert and oriented to person, place, and time.         ED Course        Procedures       No results found for this or any previous visit (from the past 24 hours).    Medications - No data to display    Assessments & Plan (with Medical Decision Making)   74-year-old male with left eye redness and irritation.  Visual acuity 20/200 to the left eye and 20/100 to the right eye both without corrective lenses.  PERRLA.  EOMs intact.  Wears eyeglasses.  Findings at this time are consistent with conjunctivitis which will be treated with tobramycin eyedrops.  He was encouraged to continue with Tylenol as needed.  Follow-up with primary doctor or eye doctor if no improvement in symptoms.  He will return to urgent care or emergency room for any worsening or concerning symptoms.    I have reviewed the nursing notes.    I have reviewed the findings, diagnosis, plan and need for follow up with the patient.  This document was prepared using a  combination of typing and voice generated software.  While every attempt was made for accuracy, spelling and grammatical errors may exist.         New Prescriptions    TOBRAMYCIN (TOBREX) 0.3 % OPHTHALMIC SOLUTION    Place 1-2 drops Into the left eye every 4 hours (while awake).       Final diagnoses:   Acute conjunctivitis of left eye       2/2/2025   [facility]       Mpohilario, Johnna, CNP  02/02/25 0906

## 2025-02-02 NOTE — ED TRIAGE NOTES
Pt presents with c/o left eye redness and itching. Sx started Friday. Used visine eye drops that helped. Has also been taking tylenol. Denies foreign object in eye or trauma to eye. Concerned about pink eye.

## (undated) DEVICE — CONNECTOR-ERBEFLO 2 PORT

## (undated) DEVICE — IRRIGATION-H2O 1000ML

## (undated) DEVICE — CANISTER-SUCTION 2000CC

## (undated) DEVICE — SENSOR-OXISENSOR II ADULT

## (undated) DEVICE — FORCEP-COLON BIOPSY LARGE W/NEEDLE 240CM

## (undated) DEVICE — CLEVER CAP-OLYMPUS ERBEFLO

## (undated) DEVICE — TUBING-SUCTION 20FT

## (undated) RX ORDER — PROPOFOL 10 MG/ML
INJECTION, EMULSION INTRAVENOUS
Status: DISPENSED
Start: 2018-06-25